# Patient Record
Sex: FEMALE | Race: WHITE | Employment: OTHER | URBAN - METROPOLITAN AREA
[De-identification: names, ages, dates, MRNs, and addresses within clinical notes are randomized per-mention and may not be internally consistent; named-entity substitution may affect disease eponyms.]

---

## 2018-07-11 ENCOUNTER — TELEPHONE (OUTPATIENT)
Dept: PAIN MANAGEMENT | Age: 63
End: 2018-07-11

## 2018-07-11 NOTE — TELEPHONE ENCOUNTER
A referral was received for this patient, however, we are not in network with their insurance unless being referred by a Collect or [address] [place] [address]. We do not accept any Urgent Care or Emergency Room referrals. A letter was faxed to the referring provider to notify them of this decision so they can provide appropriate instructions for the patient on how to continue their care.

## 2018-07-26 ENCOUNTER — HOSPITAL ENCOUNTER (OUTPATIENT)
Dept: CT IMAGING | Age: 63
Discharge: HOME | End: 2018-07-26
Attending: UROLOGY | Admitting: UROLOGY

## 2018-07-26 ENCOUNTER — HOSPITAL ENCOUNTER (OUTPATIENT)
Dept: PREADMISSION TESTING | Age: 63
Discharge: HOME | End: 2018-07-26
Attending: UROLOGY | Admitting: UROLOGY

## 2018-07-26 DIAGNOSIS — C67.5 MALIGNANT NEOPLASM OF BLADDER NECK (HCC): ICD-10-CM

## 2018-07-26 DIAGNOSIS — C80.1 MALIGNANT NEOPLASM (HCC): ICD-10-CM

## 2018-07-26 LAB
ANION GAP SERPL CALCULATED.3IONS-SCNC: 8 MMOL/L (ref 3–16)
APTT: 29.7 SEC (ref 26–36)
BUN BLDV-MCNC: 9 MG/DL (ref 7–20)
CALCIUM SERPL-MCNC: 9 MG/DL (ref 8.3–10.6)
CHLORIDE BLD-SCNC: 102 MMOL/L (ref 99–110)
CO2: 30 MMOL/L (ref 21–32)
CREAT SERPL-MCNC: <0.5 MG/DL (ref 0.6–1.2)
GFR AFRICAN AMERICAN: >60
GFR NON-AFRICAN AMERICAN: >60
GLUCOSE BLD-MCNC: 76 MG/DL (ref 70–99)
INR BLD: 1.03 (ref 0.86–1.14)
POTASSIUM SERPL-SCNC: 3 MMOL/L (ref 3.5–5.1)
PROTHROMBIN TIME: 11.7 SEC (ref 9.8–13)
SODIUM BLD-SCNC: 140 MMOL/L (ref 136–145)

## 2018-07-26 RX ORDER — CEFAZOLIN SODIUM 2 G/100ML
2 INJECTION, SOLUTION INTRAVENOUS
Status: CANCELLED | OUTPATIENT
Start: 2018-07-26 | End: 2018-07-26

## 2018-07-26 RX ORDER — DULOXETIN HYDROCHLORIDE 60 MG/1
60 CAPSULE, DELAYED RELEASE ORAL DAILY
COMMUNITY

## 2018-07-26 RX ORDER — LIDOCAINE HYDROCHLORIDE 10 MG/ML
0.5 INJECTION, SOLUTION EPIDURAL; INFILTRATION; INTRACAUDAL; PERINEURAL ONCE
Status: CANCELLED | OUTPATIENT
Start: 2018-07-26 | End: 2018-07-26

## 2018-07-26 RX ORDER — OMEPRAZOLE 20 MG/1
40 CAPSULE, DELAYED RELEASE ORAL DAILY
COMMUNITY
End: 2018-11-16 | Stop reason: ALTCHOICE

## 2018-07-26 RX ORDER — GABAPENTIN 800 MG/1
600 TABLET ORAL 3 TIMES DAILY
Status: ON HOLD | COMMUNITY
End: 2018-08-01 | Stop reason: CLARIF

## 2018-07-26 RX ORDER — METHOCARBAMOL 500 MG/1
500 TABLET, FILM COATED ORAL 2 TIMES DAILY
COMMUNITY
End: 2018-11-16 | Stop reason: ALTCHOICE

## 2018-07-26 RX ORDER — ALPRAZOLAM 0.5 MG/1
0.5 TABLET ORAL NIGHTLY PRN
Status: ON HOLD | COMMUNITY
End: 2018-08-01 | Stop reason: CLARIF

## 2018-07-26 RX ORDER — MORPHINE SULFATE 10 MG/1
10 CAPSULE, EXTENDED RELEASE ORAL DAILY
Status: ON HOLD | COMMUNITY
End: 2018-08-01 | Stop reason: CLARIF

## 2018-07-26 RX ORDER — FERROUS SULFATE 325(65) MG
325 TABLET ORAL
COMMUNITY
End: 2018-09-02

## 2018-07-26 RX ORDER — SODIUM CHLORIDE, SODIUM LACTATE, POTASSIUM CHLORIDE, CALCIUM CHLORIDE 600; 310; 30; 20 MG/100ML; MG/100ML; MG/100ML; MG/100ML
INJECTION, SOLUTION INTRAVENOUS CONTINUOUS
Status: CANCELLED | OUTPATIENT
Start: 2018-07-26

## 2018-07-26 RX ORDER — CLOPIDOGREL BISULFATE 75 MG/1
75 TABLET ORAL DAILY
COMMUNITY

## 2018-07-26 ASSESSMENT — LIFESTYLE VARIABLES: SMOKING_STATUS: 1

## 2018-07-26 NOTE — PRE-PROCEDURE INSTRUCTIONS
wear ___contact lenses or ___glasses, they will be removed; please bring a case for them. 12. Please see your family doctor/pediatrician for a history & physical and/or concerning medications. Bring any test results/reports from your physician's office. PCP__________________Phone___________H&P Appt. Date________             13 If you  have a Living Will and Durable Power of  for Healthcare, please bring in a copy. 15. Notify your Surgeon if you develop any illness between now and surgery  time, cough, cold, fever, sore throat, nausea, vomiting, etc.  Please notify your surgeon if you experience dizziness, shortness of breath or blurred vision between now & the time of your surgery             15. DO NOT shave your operative site 96 hours prior to surgery. For face & neck surgery, men may use an electric razor 48 hours prior to surgery. 16. Shower the night before surgery with ___Antibacterial soap ___Hibiclens             17. To provide excellent care visitors will be limited to one in the room at any given time. 18.  Please bring picture ID and insurance card. 19.  Visit our web site for additional information:  [email or web address]              20.During flu season no children under the age of 15 are permitted in the hospital for the safety of all patients. 21. If you take a long acting insulin in the evening only  take half of your usual  dose the night  before your procedure              22. If you use a c-pap please bring DOS if staying overnight,             23.For your convenience [facility] has a pharmacy on site to fill your prescriptions. 24. If you use oxygen and have a portable tank please bring it  with you the DOS             25. Bring a complete list of all your medications with name and dose include any supplements.              26. Other__________________________________________   *Please

## 2018-07-31 PROBLEM — R11.2 NAUSEA & VOMITING: Status: ACTIVE | Noted: 2018-07-31

## 2018-08-02 ENCOUNTER — HOSPITAL ENCOUNTER (OUTPATIENT)
Dept: SURGERY | Age: 63
Discharge: HOME | End: 2018-07-26
Attending: UROLOGY | Admitting: UROLOGY

## 2018-08-21 ENCOUNTER — OFFICE VISIT (OUTPATIENT)
Dept: SURGERY | Age: 63
End: 2018-08-21

## 2018-08-21 VITALS — SYSTOLIC BLOOD PRESSURE: 106 MMHG | WEIGHT: 143 LBS | DIASTOLIC BLOOD PRESSURE: 56 MMHG | BODY MASS INDEX: 24.55 KG/M2

## 2018-08-21 DIAGNOSIS — K44.9 HIATAL HERNIA: ICD-10-CM

## 2018-08-21 DIAGNOSIS — K31.89 ACUTE GASTRIC VOLVULUS: Primary | ICD-10-CM

## 2018-08-21 PROCEDURE — 99024 POSTOP FOLLOW-UP VISIT: CPT | Performed by: SURGERY

## 2018-08-25 PROBLEM — R10.9 ABDOMINAL PAIN: Status: ACTIVE | Noted: 2018-08-25

## 2018-09-02 PROBLEM — R11.10 INTRACTABLE VOMITING: Status: ACTIVE | Noted: 2018-09-02

## 2018-09-21 ENCOUNTER — HOSPITAL ENCOUNTER (INPATIENT)
Dept: EMERGENCY DEPT | Age: 63
LOS: 4 days | Discharge: HOME HEALTH | DRG: 422 | End: 2018-09-25
Attending: EMERGENCY MEDICINE | Admitting: INTERNAL MEDICINE
Payer: MEDICARE

## 2018-09-21 DIAGNOSIS — E87.6 HYPOKALEMIA: ICD-10-CM

## 2018-09-21 DIAGNOSIS — E86.0 DEHYDRATION: ICD-10-CM

## 2018-09-21 DIAGNOSIS — R19.7 DIARRHEA, UNSPECIFIED TYPE: Primary | ICD-10-CM

## 2018-09-21 DIAGNOSIS — N99.528 COMPLICATION OF UROSTOMY (HCC): ICD-10-CM

## 2018-09-21 DIAGNOSIS — C67.9 ADENOCARCINOMA OF BLADDER (HCC): ICD-10-CM

## 2018-09-21 PROBLEM — K21.9 GERD (GASTROESOPHAGEAL REFLUX DISEASE): Status: ACTIVE | Noted: 2018-09-21

## 2018-09-21 PROBLEM — Z93.2 HIGH OUTPUT ILEOSTOMY (HCC): Status: ACTIVE | Noted: 2018-09-21

## 2018-09-21 PROBLEM — R19.8 HIGH OUTPUT ILEOSTOMY (HCC): Status: ACTIVE | Noted: 2018-09-21

## 2018-09-21 PROBLEM — F41.9 ANXIETY: Status: ACTIVE | Noted: 2018-09-21

## 2018-09-21 PROBLEM — E78.00 HIGH CHOLESTEROL: Status: ACTIVE | Noted: 2018-09-21

## 2018-09-21 LAB
A/G RATIO: 1.1 (ref 1.1–2.2)
ALBUMIN SERPL-MCNC: 3.7 G/DL (ref 3.4–5)
ALP BLD-CCNC: 164 U/L (ref 40–129)
ALT SERPL-CCNC: 14 U/L (ref 10–40)
ANION GAP SERPL CALCULATED.3IONS-SCNC: 17 MMOL/L (ref 3–16)
AST SERPL-CCNC: 14 U/L (ref 15–37)
BACTERIA: ABNORMAL /HPF
BASOPHILS ABSOLUTE: 0.1 K/UL (ref 0–0.2)
BASOPHILS RELATIVE PERCENT: 0.7 %
BILIRUB SERPL-MCNC: 0.4 MG/DL (ref 0–1)
BILIRUBIN URINE: NEGATIVE
BLOOD, URINE: ABNORMAL
BUN BLDV-MCNC: 10 MG/DL (ref 7–20)
CALCIUM SERPL-MCNC: 9.6 MG/DL (ref 8.3–10.6)
CHLORIDE BLD-SCNC: 102 MMOL/L (ref 99–110)
CLARITY: ABNORMAL
CO2: 21 MMOL/L (ref 21–32)
COLOR: YELLOW
CREAT SERPL-MCNC: <0.5 MG/DL (ref 0.6–1.2)
EOSINOPHILS ABSOLUTE: 0.1 K/UL (ref 0–0.6)
EOSINOPHILS RELATIVE PERCENT: 0.6 %
EPITHELIAL CELLS, UA: 0 /HPF (ref 0–5)
GFR AFRICAN AMERICAN: >60
GFR NON-AFRICAN AMERICAN: >60
GLOBULIN: 3.3 G/DL
GLUCOSE BLD-MCNC: 108 MG/DL (ref 70–99)
GLUCOSE URINE: NEGATIVE MG/DL
HCT VFR BLD CALC: 47 % (ref 36–48)
HEMOGLOBIN: 15.3 G/DL (ref 12–16)
HYALINE CASTS: 8 /LPF (ref 0–8)
KETONES, URINE: NEGATIVE MG/DL
LACTIC ACID: 1.2 MMOL/L (ref 0.4–2)
LEUKOCYTE ESTERASE, URINE: ABNORMAL
LIPASE: 21 U/L (ref 13–60)
LYMPHOCYTES ABSOLUTE: 1.5 K/UL (ref 1–5.1)
LYMPHOCYTES RELATIVE PERCENT: 14.7 %
MCH RBC QN AUTO: 33 PG (ref 26–34)
MCHC RBC AUTO-ENTMCNC: 32.6 G/DL (ref 31–36)
MCV RBC AUTO: 101 FL (ref 80–100)
MICROSCOPIC EXAMINATION: YES
MONOCYTES ABSOLUTE: 0.3 K/UL (ref 0–1.3)
MONOCYTES RELATIVE PERCENT: 3 %
NEUTROPHILS ABSOLUTE: 8.4 K/UL (ref 1.7–7.7)
NEUTROPHILS RELATIVE PERCENT: 81 %
NITRITE, URINE: NEGATIVE
PDW BLD-RTO: 18.9 % (ref 12.4–15.4)
PH UA: 7.5
PLATELET # BLD: 347 K/UL (ref 135–450)
PMV BLD AUTO: 7.6 FL (ref 5–10.5)
POTASSIUM SERPL-SCNC: 2.8 MMOL/L (ref 3.5–5.1)
PROTEIN UA: ABNORMAL MG/DL
RBC # BLD: 4.65 M/UL (ref 4–5.2)
RBC UA: 2 /HPF (ref 0–4)
SODIUM BLD-SCNC: 140 MMOL/L (ref 136–145)
SPECIFIC GRAVITY UA: >1.03
TOTAL PROTEIN: 7 G/DL (ref 6.4–8.2)
URINE REFLEX TO CULTURE: YES
URINE TYPE: ABNORMAL
UROBILINOGEN, URINE: 0.2 E.U./DL
WBC # BLD: 10.4 K/UL (ref 4–11)
WBC UA: 33 /HPF (ref 0–5)

## 2018-09-21 PROCEDURE — 81001 URINALYSIS AUTO W/SCOPE: CPT

## 2018-09-21 PROCEDURE — 9990 CHARGE CONVERSION

## 2018-09-21 PROCEDURE — 85025 COMPLETE CBC W/AUTO DIFF WBC: CPT

## 2018-09-21 PROCEDURE — 74177 CT ABD & PELVIS W/CONTRAST: CPT

## 2018-09-21 PROCEDURE — 87186 SC STD MICRODIL/AGAR DIL: CPT

## 2018-09-21 PROCEDURE — 36415 COLL VENOUS BLD VENIPUNCTURE: CPT

## 2018-09-21 PROCEDURE — 87040 BLOOD CULTURE FOR BACTERIA: CPT

## 2018-09-21 PROCEDURE — 87077 CULTURE AEROBIC IDENTIFY: CPT

## 2018-09-21 PROCEDURE — 83690 ASSAY OF LIPASE: CPT

## 2018-09-21 PROCEDURE — 99285 EMERGENCY DEPT VISIT HI MDM: CPT

## 2018-09-21 PROCEDURE — 83605 ASSAY OF LACTIC ACID: CPT

## 2018-09-21 PROCEDURE — 87086 URINE CULTURE/COLONY COUNT: CPT

## 2018-09-21 PROCEDURE — 80053 COMPREHEN METABOLIC PANEL: CPT

## 2018-09-21 RX ORDER — 0.9 % SODIUM CHLORIDE 0.9 %
1000 INTRAVENOUS SOLUTION INTRAVENOUS ONCE
Status: COMPLETED | OUTPATIENT
Start: 2018-09-21 | End: 2018-09-21

## 2018-09-21 RX ORDER — MORPHINE SULFATE 4 MG/ML
4 INJECTION, SOLUTION INTRAMUSCULAR; INTRAVENOUS ONCE
Status: COMPLETED | OUTPATIENT
Start: 2018-09-21 | End: 2018-09-21

## 2018-09-21 RX ORDER — 0.9 % SODIUM CHLORIDE 0.9 %
1000 INTRAVENOUS SOLUTION INTRAVENOUS ONCE
Status: DISCONTINUED | OUTPATIENT
Start: 2018-09-21 | End: 2018-09-21

## 2018-09-21 RX ORDER — POTASSIUM CHLORIDE AND SODIUM CHLORIDE 900; 300 MG/100ML; MG/100ML
100 INJECTION, SOLUTION INTRAVENOUS CONTINUOUS
Status: DISCONTINUED | OUTPATIENT
Start: 2018-09-21 | End: 2018-09-24

## 2018-09-21 RX ORDER — ONDANSETRON 2 MG/ML
4 INJECTION INTRAMUSCULAR; INTRAVENOUS ONCE
Status: COMPLETED | OUTPATIENT
Start: 2018-09-21 | End: 2018-09-21

## 2018-09-21 RX ADMIN — POTASSIUM CHLORIDE AND SODIUM CHLORIDE 100 ML/HR: 900; 300 INJECTION, SOLUTION INTRAVENOUS at 20:54

## 2018-09-21 RX ADMIN — ONDANSETRON 4 MG: 2 INJECTION INTRAMUSCULAR; INTRAVENOUS at 20:55

## 2018-09-21 RX ADMIN — MORPHINE SULFATE 4 MG: 4 INJECTION, SOLUTION INTRAMUSCULAR; INTRAVENOUS at 20:58

## 2018-09-21 RX ADMIN — Medication 1000 ML: at 22:45

## 2018-09-21 ASSESSMENT — ENCOUNTER SYMPTOMS
CONSTIPATION: 0
ABDOMINAL DISTENTION: 0
COUGH: 0
ABDOMINAL PAIN: 1
RECTAL PAIN: 0
RESPIRATORY NEGATIVE: 1
PHOTOPHOBIA: 0
SHORTNESS OF BREATH: 0
COLOR CHANGE: 0
DIARRHEA: 1
ANAL BLEEDING: 0
BLOOD IN STOOL: 0
NAUSEA: 1
VOMITING: 1

## 2018-09-21 ASSESSMENT — PAIN SCALES - GENERAL
PAINLEVEL_OUTOF10: 7

## 2018-09-21 ASSESSMENT — PAIN DESCRIPTION - PAIN TYPE
TYPE: ACUTE PAIN
TYPE: ACUTE PAIN

## 2018-09-21 ASSESSMENT — PAIN DESCRIPTION - PROGRESSION: CLINICAL_PROGRESSION: NOT CHANGED

## 2018-09-21 ASSESSMENT — PAIN DESCRIPTION - LOCATION
LOCATION: ABDOMEN
LOCATION: ABDOMEN

## 2018-09-22 LAB
ANION GAP SERPL CALCULATED.3IONS-SCNC: 10 MMOL/L (ref 3–16)
BASOPHILS ABSOLUTE: 0 K/UL (ref 0–0.2)
BASOPHILS RELATIVE PERCENT: 0.4 %
BUN BLDV-MCNC: 7 MG/DL (ref 7–20)
CALCIUM SERPL-MCNC: 8.7 MG/DL (ref 8.3–10.6)
CHLORIDE BLD-SCNC: 110 MMOL/L (ref 99–110)
CO2: 21 MMOL/L (ref 21–32)
CREAT SERPL-MCNC: 0.5 MG/DL (ref 0.6–1.2)
EOSINOPHILS ABSOLUTE: 0.2 K/UL (ref 0–0.6)
EOSINOPHILS RELATIVE PERCENT: 2.7 %
GFR AFRICAN AMERICAN: >60
GFR NON-AFRICAN AMERICAN: >60
GLUCOSE BLD-MCNC: 73 MG/DL (ref 70–99)
HCT VFR BLD CALC: 39.3 % (ref 36–48)
HEMOGLOBIN: 13 G/DL (ref 12–16)
LYMPHOCYTES ABSOLUTE: 1.5 K/UL (ref 1–5.1)
LYMPHOCYTES RELATIVE PERCENT: 20.9 %
MCH RBC QN AUTO: 34 PG (ref 26–34)
MCHC RBC AUTO-ENTMCNC: 33 G/DL (ref 31–36)
MCV RBC AUTO: 103.2 FL (ref 80–100)
MONOCYTES ABSOLUTE: 0.3 K/UL (ref 0–1.3)
MONOCYTES RELATIVE PERCENT: 3.6 %
NEUTROPHILS ABSOLUTE: 5.2 K/UL (ref 1.7–7.7)
NEUTROPHILS RELATIVE PERCENT: 72.4 %
PDW BLD-RTO: 18.3 % (ref 12.4–15.4)
PLATELET # BLD: 253 K/UL (ref 135–450)
PMV BLD AUTO: 7.7 FL (ref 5–10.5)
POTASSIUM SERPL-SCNC: 3.4 MMOL/L (ref 3.5–5.1)
RBC # BLD: 3.81 M/UL (ref 4–5.2)
SODIUM BLD-SCNC: 141 MMOL/L (ref 136–145)
WBC # BLD: 7.2 K/UL (ref 4–11)

## 2018-09-22 PROCEDURE — 6370000000 HC RX 637 (ALT 250 FOR IP): Performed by: INTERNAL MEDICINE

## 2018-09-22 PROCEDURE — 93005 ELECTROCARDIOGRAM TRACING: CPT | Performed by: INTERNAL MEDICINE

## 2018-09-22 PROCEDURE — 93010 ELECTROCARDIOGRAM REPORT: CPT | Performed by: INTERNAL MEDICINE

## 2018-09-22 PROCEDURE — 80048 BASIC METABOLIC PNL TOTAL CA: CPT

## 2018-09-22 PROCEDURE — 36415 COLL VENOUS BLD VENIPUNCTURE: CPT

## 2018-09-22 PROCEDURE — 6360000002 HC RX W HCPCS: Performed by: INTERNAL MEDICINE

## 2018-09-22 PROCEDURE — 87449 NOS EACH ORGANISM AG IA: CPT

## 2018-09-22 PROCEDURE — APPNB30 APP NON BILLABLE TIME 0-30 MINS: Performed by: NURSE PRACTITIONER

## 2018-09-22 PROCEDURE — 1200000000 HC SEMI PRIVATE

## 2018-09-22 PROCEDURE — 85025 COMPLETE CBC W/AUTO DIFF WBC: CPT

## 2018-09-22 PROCEDURE — 6360000002 HC RX W HCPCS: Performed by: EMERGENCY MEDICINE

## 2018-09-22 PROCEDURE — 87324 CLOSTRIDIUM AG IA: CPT

## 2018-09-22 PROCEDURE — 99024 POSTOP FOLLOW-UP VISIT: CPT | Performed by: SURGERY

## 2018-09-22 RX ORDER — DULOXETIN HYDROCHLORIDE 30 MG/1
60 CAPSULE, DELAYED RELEASE ORAL DAILY
Status: DISCONTINUED | OUTPATIENT
Start: 2018-09-22 | End: 2018-09-25 | Stop reason: HOSPADM

## 2018-09-22 RX ORDER — POTASSIUM CHLORIDE 7.45 MG/ML
10 INJECTION INTRAVENOUS PRN
Status: DISCONTINUED | OUTPATIENT
Start: 2018-09-22 | End: 2018-09-25 | Stop reason: HOSPADM

## 2018-09-22 RX ORDER — SODIUM CHLORIDE 9 MG/ML
INJECTION, SOLUTION INTRAVENOUS CONTINUOUS
Status: DISCONTINUED | OUTPATIENT
Start: 2018-09-22 | End: 2018-09-23

## 2018-09-22 RX ORDER — POTASSIUM CHLORIDE 20 MEQ/1
40 TABLET, EXTENDED RELEASE ORAL PRN
Status: DISCONTINUED | OUTPATIENT
Start: 2018-09-22 | End: 2018-09-25 | Stop reason: HOSPADM

## 2018-09-22 RX ORDER — HYDROXYUREA 500 MG/1
500 CAPSULE ORAL
Status: DISCONTINUED | OUTPATIENT
Start: 2018-09-22 | End: 2018-09-22

## 2018-09-22 RX ORDER — ATORVASTATIN CALCIUM 40 MG/1
40 TABLET, FILM COATED ORAL EVERY EVENING
Status: DISCONTINUED | OUTPATIENT
Start: 2018-09-22 | End: 2018-09-25 | Stop reason: HOSPADM

## 2018-09-22 RX ORDER — SODIUM CHLORIDE 0.9 % (FLUSH) 0.9 %
10 SYRINGE (ML) INJECTION EVERY 12 HOURS SCHEDULED
Status: DISCONTINUED | OUTPATIENT
Start: 2018-09-22 | End: 2018-09-25 | Stop reason: HOSPADM

## 2018-09-22 RX ORDER — 0.9 % SODIUM CHLORIDE 0.9 %
500 INTRAVENOUS SOLUTION INTRAVENOUS PRN
Status: DISCONTINUED | OUTPATIENT
Start: 2018-09-22 | End: 2018-09-25 | Stop reason: HOSPADM

## 2018-09-22 RX ORDER — MORPHINE SULFATE 15 MG/1
15 TABLET, FILM COATED, EXTENDED RELEASE ORAL 2 TIMES DAILY
Status: DISCONTINUED | OUTPATIENT
Start: 2018-09-22 | End: 2018-09-25 | Stop reason: HOSPADM

## 2018-09-22 RX ORDER — ONDANSETRON 2 MG/ML
INJECTION INTRAMUSCULAR; INTRAVENOUS
Status: DISPENSED
Start: 2018-09-22 | End: 2018-09-22

## 2018-09-22 RX ORDER — CLOPIDOGREL BISULFATE 75 MG/1
75 TABLET ORAL DAILY
Status: DISCONTINUED | OUTPATIENT
Start: 2018-09-22 | End: 2018-09-22

## 2018-09-22 RX ORDER — GABAPENTIN 400 MG/1
800 CAPSULE ORAL 3 TIMES DAILY
Status: DISCONTINUED | OUTPATIENT
Start: 2018-09-22 | End: 2018-09-25 | Stop reason: HOSPADM

## 2018-09-22 RX ORDER — SODIUM CHLORIDE 0.9 % (FLUSH) 0.9 %
10 SYRINGE (ML) INJECTION PRN
Status: DISCONTINUED | OUTPATIENT
Start: 2018-09-22 | End: 2018-09-25 | Stop reason: HOSPADM

## 2018-09-22 RX ORDER — DOCUSATE SODIUM 100 MG/1
100 CAPSULE, LIQUID FILLED ORAL 2 TIMES DAILY
Status: DISCONTINUED | OUTPATIENT
Start: 2018-09-22 | End: 2018-09-25 | Stop reason: HOSPADM

## 2018-09-22 RX ORDER — METHOCARBAMOL 500 MG/1
500 TABLET, FILM COATED ORAL 2 TIMES DAILY
Status: DISCONTINUED | OUTPATIENT
Start: 2018-09-22 | End: 2018-09-25 | Stop reason: HOSPADM

## 2018-09-22 RX ORDER — HEPARIN SODIUM 5000 [USP'U]/ML
5000 INJECTION, SOLUTION INTRAVENOUS; SUBCUTANEOUS EVERY 8 HOURS SCHEDULED
Status: DISCONTINUED | OUTPATIENT
Start: 2018-09-22 | End: 2018-09-25 | Stop reason: HOSPADM

## 2018-09-22 RX ORDER — PANTOPRAZOLE SODIUM 40 MG/1
40 TABLET, DELAYED RELEASE ORAL
Status: DISCONTINUED | OUTPATIENT
Start: 2018-09-22 | End: 2018-09-25 | Stop reason: HOSPADM

## 2018-09-22 RX ORDER — HYDROXYUREA 500 MG/1
500 CAPSULE ORAL
Status: DISCONTINUED | OUTPATIENT
Start: 2018-09-24 | End: 2018-09-25 | Stop reason: HOSPADM

## 2018-09-22 RX ORDER — OMEPRAZOLE 20 MG/1
40 CAPSULE, DELAYED RELEASE ORAL DAILY
Status: DISCONTINUED | OUTPATIENT
Start: 2018-09-22 | End: 2018-09-22 | Stop reason: CLARIF

## 2018-09-22 RX ORDER — ALPRAZOLAM 0.5 MG/1
0.5 TABLET ORAL 2 TIMES DAILY
Status: DISCONTINUED | OUTPATIENT
Start: 2018-09-22 | End: 2018-09-25 | Stop reason: HOSPADM

## 2018-09-22 RX ORDER — OXYCODONE AND ACETAMINOPHEN 7.5; 325 MG/1; MG/1
1 TABLET ORAL EVERY 8 HOURS PRN
Status: DISCONTINUED | OUTPATIENT
Start: 2018-09-22 | End: 2018-09-24

## 2018-09-22 RX ORDER — ONDANSETRON 2 MG/ML
4 INJECTION INTRAMUSCULAR; INTRAVENOUS EVERY 6 HOURS PRN
Status: DISCONTINUED | OUTPATIENT
Start: 2018-09-22 | End: 2018-09-25 | Stop reason: HOSPADM

## 2018-09-22 RX ADMIN — OXYCODONE HYDROCHLORIDE AND ACETAMINOPHEN 1 TABLET: 7.5; 325 TABLET ORAL at 23:41

## 2018-09-22 RX ADMIN — MORPHINE SULFATE 15 MG: 15 TABLET, FILM COATED, EXTENDED RELEASE ORAL at 01:30

## 2018-09-22 RX ADMIN — MORPHINE SULFATE 15 MG: 15 TABLET, FILM COATED, EXTENDED RELEASE ORAL at 21:08

## 2018-09-22 RX ADMIN — DESMOPRESSIN ACETATE 40 MG: 0.2 TABLET ORAL at 18:55

## 2018-09-22 RX ADMIN — HEPARIN SODIUM 5000 UNITS: 5000 INJECTION INTRAVENOUS; SUBCUTANEOUS at 21:08

## 2018-09-22 RX ADMIN — DULOXETINE HYDROCHLORIDE 60 MG: 30 CAPSULE, DELAYED RELEASE ORAL at 08:11

## 2018-09-22 RX ADMIN — ONDANSETRON 4 MG: 2 INJECTION INTRAMUSCULAR; INTRAVENOUS at 05:30

## 2018-09-22 RX ADMIN — HEPARIN SODIUM 5000 UNITS: 5000 INJECTION INTRAVENOUS; SUBCUTANEOUS at 08:30

## 2018-09-22 RX ADMIN — OXYCODONE HYDROCHLORIDE AND ACETAMINOPHEN 1 TABLET: 7.5; 325 TABLET ORAL at 12:14

## 2018-09-22 RX ADMIN — ALPRAZOLAM 0.5 MG: 0.5 TABLET ORAL at 08:11

## 2018-09-22 RX ADMIN — PANTOPRAZOLE SODIUM 40 MG: 40 TABLET, DELAYED RELEASE ORAL at 08:10

## 2018-09-22 RX ADMIN — POTASSIUM BICARBONATE 40 MEQ: 782 TABLET, EFFERVESCENT ORAL at 15:50

## 2018-09-22 RX ADMIN — METHOCARBAMOL 500 MG: 500 TABLET ORAL at 01:30

## 2018-09-22 RX ADMIN — ALPRAZOLAM 0.5 MG: 0.5 TABLET ORAL at 21:08

## 2018-09-22 RX ADMIN — POTASSIUM CHLORIDE AND SODIUM CHLORIDE 100 ML/HR: 900; 300 INJECTION, SOLUTION INTRAVENOUS at 08:01

## 2018-09-22 RX ADMIN — ALPRAZOLAM 0.5 MG: 0.5 TABLET ORAL at 01:30

## 2018-09-22 RX ADMIN — HEPARIN SODIUM 5000 UNITS: 5000 INJECTION INTRAVENOUS; SUBCUTANEOUS at 01:30

## 2018-09-22 RX ADMIN — HEPARIN SODIUM 5000 UNITS: 5000 INJECTION INTRAVENOUS; SUBCUTANEOUS at 15:43

## 2018-09-22 RX ADMIN — POTASSIUM CHLORIDE AND SODIUM CHLORIDE 100 ML/HR: 900; 300 INJECTION, SOLUTION INTRAVENOUS at 18:54

## 2018-09-22 RX ADMIN — METHOCARBAMOL 500 MG: 500 TABLET ORAL at 08:11

## 2018-09-22 RX ADMIN — GABAPENTIN 800 MG: 400 CAPSULE ORAL at 21:08

## 2018-09-22 RX ADMIN — GABAPENTIN 800 MG: 400 CAPSULE ORAL at 01:30

## 2018-09-22 RX ADMIN — GABAPENTIN 800 MG: 400 CAPSULE ORAL at 15:43

## 2018-09-22 RX ADMIN — CLOPIDOGREL 75 MG: 75 TABLET, FILM COATED ORAL at 08:10

## 2018-09-22 RX ADMIN — MORPHINE SULFATE 15 MG: 15 TABLET, FILM COATED, EXTENDED RELEASE ORAL at 08:11

## 2018-09-22 RX ADMIN — GABAPENTIN 800 MG: 400 CAPSULE ORAL at 08:11

## 2018-09-22 RX ADMIN — METHOCARBAMOL 500 MG: 500 TABLET ORAL at 15:43

## 2018-09-22 RX ADMIN — ONDANSETRON 4 MG: 2 INJECTION INTRAMUSCULAR; INTRAVENOUS at 15:48

## 2018-09-22 ASSESSMENT — PAIN DESCRIPTION - LOCATION
LOCATION: ABDOMEN

## 2018-09-22 ASSESSMENT — PAIN SCALES - GENERAL
PAINLEVEL_OUTOF10: 5
PAINLEVEL_OUTOF10: 4
PAINLEVEL_OUTOF10: 7
PAINLEVEL_OUTOF10: 6
PAINLEVEL_OUTOF10: 7
PAINLEVEL_OUTOF10: 7
PAINLEVEL_OUTOF10: 8
PAINLEVEL_OUTOF10: 7

## 2018-09-22 ASSESSMENT — PAIN DESCRIPTION - PAIN TYPE
TYPE: CHRONIC PAIN

## 2018-09-22 ASSESSMENT — PAIN DESCRIPTION - PROGRESSION: CLINICAL_PROGRESSION: GRADUALLY IMPROVING

## 2018-09-22 NOTE — ED NOTES
Pt cleaned and linen changed. Urostomy changed and taped around site. #2 saline lock inserted with #2 blood culture drawn and NS infusing IV w/o with NS with KCL at 100 ml/hr IV. Pt to be admitted. Resting at present.      Og Islas RN  09/21/18 5130

## 2018-09-22 NOTE — PROGRESS NOTES
Progress Note - Dr. Cuca Stearns - Internal Medicine  PCP: Christi Carlos MD [address] / Jerardo Cough [id number]    Hospital Day: 1  Code Status: Full Code  Current Diet: DIET GENERAL;        CC: follow up on medical issues    Subjective:   Anu Vela is a 58 y.o. female. She admits to problems    Still with leakage to ostomy  Complains of dysphagia and pain to epigastric area    She denies chest pain, denies shortness of breath, denies nausea,  denies emesis. 10 system Review of Systems is reviewed with patient, and pertinent positives are listed here: None . Otherwise, Review of systems is negative. I have reviewed the patient's medical and social history in detail and updated the computerized patient record. To recap: She  has a past medical history of Bladder cancer ([facility] Blood clot in vein; Depression; Hx of blood clots; and Polycythemia. . She  has a past surgical history that includes Carpal tunnel release; Pilonidal cyst drainage; Foot surgery; Hysterectomy; Cholecystectomy; above knee amputation (Right); hiatal hernia repair (08/03/2018); and Bladder removal (08/27/2018). . She  reports that she quit smoking about 2 months ago. Her smoking use included Cigarettes. She has a 60.00 pack-year smoking history. She has never used smokeless tobacco. She reports that she does not drink alcohol or use drugs. .        Active Hospital Problems    Diagnosis Date Noted    Hypokalemia [E87.6] 09/21/2018    High output ileostomy ([email or web address] [R19.8, Z93.2] 09/21/2018    GERD (gastroesophageal reflux disease) [K21.9] 09/21/2018    Anxiety [F41.9] 09/21/2018    High cholesterol [E78.00] 09/21/2018    Dehydration [E86.0] 09/21/2018       Current Facility-Administered Medications: nicotine (NICODERM CQ) 7 MG/24HR 1 patch, 1 patch, Transdermal, Daily  atorvastatin (LIPITOR) tablet 40 mg, 40 mg, Oral, QPM  clopidogrel (PLAVIX) tablet 75 mg, 75 mg, Oral, Daily  DULoxetine (CYMBALTA) extended release capsule 60 mg, 60 mg, Oral, Daily  methocarbamol (ROBAXIN) tablet 500 mg, 500 mg, Oral, BID  morphine (MS CONTIN) extended release tablet 15 mg, 15 mg, Oral, BID  ALPRAZolam (XANAX) tablet 0.5 mg, 0.5 mg, Oral, BID  gabapentin (NEURONTIN) capsule 800 mg, 800 mg, Oral, TID  0.9 % sodium chloride infusion, , Intravenous, Continuous  sodium chloride flush 0.9 % injection 10 mL, 10 mL, Intravenous, 2 times per day  sodium chloride flush 0.9 % injection 10 mL, 10 mL, Intravenous, PRN  docusate sodium (COLACE) capsule 100 mg, 100 mg, Oral, BID  ondansetron (ZOFRAN) injection 4 mg, 4 mg, Intravenous, Q6H PRN  heparin (porcine) injection 5,000 Units, 5,000 Units, Subcutaneous, 3 times per day  potassium chloride (KLOR-CON M) extended release tablet 40 mEq, 40 mEq, Oral, PRN **OR** potassium bicarb-citric acid (EFFER-K) effervescent tablet 40 mEq, 40 mEq, Oral, PRN **OR** potassium chloride 10 mEq/100 mL IVPB (Peripheral Line), 10 mEq, Intravenous, PRN  0.9 % sodium chloride bolus, 500 mL, Intravenous, PRN  ondansetron (ZOFRAN) 4 MG/2ML injection, , ,   pantoprazole (PROTONIX) tablet 40 mg, 40 mg, Oral, QAM AC  oxyCODONE-acetaminophen (PERCOCET) 7.5-325 MG per tablet 1 tablet, 1 tablet, Oral, Q8H PRN  hydroxyurea (HYDREA) chemo capsule 500 mg, 500 mg, Oral, As Directed RT PRN  0.9% NaCl with KCl 40 mEq infusion, 100 mL/hr, Intravenous, Continuous         Objective:  /76   Pulse 70   Temp 98 °F (36.7 °C) (Oral)   Resp 20   Wt 130 lb (59 kg)   SpO2 95%   BMI 22.31 kg/m²      Patient Vitals for the past 24 hrs:   BP Temp Temp src Pulse Resp SpO2 Weight   09/22/18 0800 123/76 98 °F (36.7 °C) Oral 70 20 95 % -   09/21/18 2300 131/77 - - 75 16 99 % -   09/21/18 2200 112/82 - - 77 15 98 % -   09/21/18 2145 114/78 - - 78 24 96 % -   09/21/18 2130 112/74 - - 78 21 98 % -   09/21/18 2115 107/71 - - 78 21 96 % -   09/21/18 2045 - - - 84 17 - -   09/21/18 2030 - - - 101 25 - -   09/21/18 2015 - - - 86 21 - - history is now available. The patient underwent hiatal hernia repair with Nissen fundoplication on 03/82/2705. The changes at the GE junction are are consistent with previous Nissen repair. If there is continued symptomatology, consider GI study. Xr Chest Portable    Result Date: 8/25/2018  EXAMINATION: SINGLE XRAY VIEW OF THE CHEST 8/25/2018 5:19 pm COMPARISON: 07/06/2009 HISTORY: ORDERING PHYSICIAN PROVIDED HISTORY: vomiting TECHNOLOGIST PROVIDED HISTORY: Technologist Provided Reason for Exam: NAUSEA AND VOMITING. FORMER SMOKER. HISTORY OF BLADDER CANCER, DVT AND POLYCYTHEMIA Acuity: Acute Type of Encounter: Initial History of bladder carcinoma, blood clots, Nissen fundoplication 08/91/9911 FINDINGS: No acute bony abnormality. The heart size and mediastinal contours are stable. The lungs are clear. Negative portable study. Assessment and Plan:  Patient Active Hospital Problem List:   High output ileostomy ([facility] (9/21/2018)    Assessment: Established problem. Stable. surg asked to see. Plan: cont local care. Monitor I/o. Trend serial labs. Given epigastric pain and fullness, will also ask GI to see   Hypokalemia (9/21/2018)    Assessment: Established problem. imprved 3.4    Plan: cont fluids, cont kcl replacement   GERD (gastroesophageal reflux disease) (9/21/2018)    Assessment: Established problem. Stable. Plan: Continue present orders/plan. Anxiety (9/21/2018)    Assessment: Established problem. Stable. Plan: Continue present orders/plan. High cholesterol (9/21/2018)    Assessment: Established problem. Stable. .    Plan: Continue present orders/plan. Dehydration (9/21/2018)    Assessment: due to high output    Plan: ivf ordered    Pt states that PCP is Dr Layla Lozoya - he admitted her a few weeks ago. She would very much like to be seen by her PCP. I have placed consult to him.  Also have left msg via perfect amy Sullivan  9/22/2018

## 2018-09-22 NOTE — PROGRESS NOTES
Was able to contact dr Quentin Huber  He will assume care and start rounding on pt tomorrow    Attending MD changed in Bradley Kauffman after his acceptance

## 2018-09-22 NOTE — PROGRESS NOTES
Spoke with Dr. Chang Ortiz with urology, order obtained to consult wound/ostomy nurse. Urostomy leaking, changed, skin care provided, will monitor.  Ginny Watts

## 2018-09-22 NOTE — ED NOTES
Report called to [facility] RN. Pt to be transferred to room 470.      Na Metzger RN  09/21/18 5936

## 2018-09-22 NOTE — H&P
History and Physical  Dr. Shirin Diaz  9/21/2018    PCP: Mercedes Hernandez MD    Cc:   Chief Complaint   Patient presents with    Emesis     vomiting x5 days. patient reports hasn't been able to eat or drink anything. patient reports leaking stoma. Johnney Yaya abdominal pain. dry heaves in triage. +diarrhea       HPI:  Aleja Sutherland is a 58 y.o.  female who  has a past medical history of Bladder cancer ([facility] Blood clot in vein; Depression; Hx of blood clots; and Polycythemia. Patient presents with High output ileostomy ([facility] HPI of presenting complaint in block format: (1-3 for expanded problem focused, ?4 for detailed/comprehensive)   Location: abd pain. Leaking from ostomy  Duration: for 2 days now  Timing:    Context: 58 y.o. female with past medical history of bladder cancer, previous blood clot, depression, polycythemia who presents ED with complaint of nausea, vomiting, diarrhea and generalized abdominal pain. States aching pain rated 7/10 diffusely throughout her abdomen. States had watery output from ostomy site. Severity: pain rated 7/10  Quality: crampy achy pain  Modifying Factors: worse after eating  Associated Signs or Symptoms: Denies chest pain or shortness of breath. Associated fatigue and weakness. Denies urinary symptoms. Problem list of hospitalization thus far: Active Hospital Problems    Diagnosis    Hypokalemia [E87.6]    High output ileostomy (HCC) [R19.8, Z93.2]    GERD (gastroesophageal reflux disease) [K21.9]    Anxiety [F41.9]    High cholesterol [E78.00]         Review of Systems: (1 system for EPF, 2-9 for detailed, 10+ for comprehensive)  Review of Systems   Constitutional: Positive for malaise/fatigue. Negative for chills and fever. HENT: Negative for congestion and nosebleeds. Eyes: Negative for double vision and photophobia. Respiratory: Positive for shortness of breath. Negative for cough. Cardiovascular: Negative for chest pain and palpitations. Gastrointestinal: Positive for abdominal pain, diarrhea, nausea and vomiting. Genitourinary: Negative for dysuria and frequency. Musculoskeletal: Positive for myalgias. Negative for back pain. Skin: Negative for itching and rash. Neurological: Positive for weakness. Negative for dizziness, sensory change and seizures. Endo/Heme/Allergies: Negative for environmental allergies. Psychiatric/Behavioral: Positive for depression. The patient is not nervous/anxious. Past Medical History:   Past Medical History:   Diagnosis Date    Bladder cancer ([facility]     Blood clot in vein     right leg    Depression     Hx of blood clots     Polycythemia        Past Surgical History:   Past Surgical History:   Procedure Laterality Date    ABOVE KNEE AMPUTATION Right     BLADDER REMOVAL  08/27/2018    Robotic assisted laparoscopic radical cystectomy with ileal conduit, bilateral pelvic lymphadenectomy, umbilectomy, and layered closure of the abdominal wall    CARPAL TUNNEL RELEASE      BILAT    CHOLECYSTECTOMY      FOOT SURGERY      BILAT FOR RECONSTRUCTION    HIATAL HERNIA REPAIR  08/03/2018    LAPAROSCOPIC HERNIA HIATAL WITH NISSEN FUNDOPLICATION    HYSTERECTOMY      PARTIAL    PILONIDAL CYST DRAINAGE         Social History:   Social History     Social History    Marital status: Single     Spouse name: N/A    Number of children: N/A    Years of education: N/A     Occupational History    Not on file.      Social History Main Topics    Smoking status: Former Smoker     Packs/day: 1.50     Years: 40.00     Types: Cigarettes     Quit date: 7/19/2018    Smokeless tobacco: Never Used    Alcohol use No    Drug use: No    Sexual activity: Not on file     Other Topics Concern    Not on file     Social History Narrative    No narrative on file       Fam History:   Family History   Problem Relation Age of Onset    Cancer Mother     Heart Disease Father     Diabetes Brother        PFSH: The above Assessment: Established problem. Stable. Plan: Continue present orders/plan. Diagnoses as listed above, designated as new or established and plan outlined for each. Data Reviewed:   (1) Lab tests were reviewed or ordered. (1) Radiology tests were reviewed or ordered. (1) Medical test (Echo, EKG, PFT/stanley) were ordered. (1) History was not obtained from someone other than patient  (1) Old records  were reviewed - see HPI/MDM for pertinent details if review done. (2) Case was discussed with another health care provider: Dr Ev Carl  (2) Imaging was viewed by myself. Ct abd/pelvis  (2) EKG  was not viewed by myself. The patient is being placed in inpatient status with the expectation of requiring a hospital stay spanning at least two midnights for care and treatment of the problems noted in the problem list.  This determination is also based on the patients comorbidities and past medical history, the severity and timing of the signs and symptoms upon presentation.         Electronically signed by: Kishor Tena 9/21/2018

## 2018-09-22 NOTE — CONSULTS
y.o. female. I saw the patient today for leakage from ostomy, and associated symptoms of dehydration, that have been present for 1 week. Symptoms relieved by nothing and aggravated by nothing. She has tried the following treatments: going to the ER. Past Medical History:   She has a past medical history of Bladder cancer ([address] Blood clot in vein; Depression; blood clots; and Polycythemia. Past Surgical History:  She has a past surgical history that includes Carpal tunnel release; Pilonidal cyst drainage; Foot surgery; Hysterectomy; Cholecystectomy; above knee amputation (Right); hiatal hernia repair (08/03/2018); and Bladder removal (08/27/2018). Allergies:   No Known Allergies    Social History:  She reports that she quit smoking about 2 months ago. Her smoking use included Cigarettes. She has a 60.00 pack-year smoking history. She has never used smokeless tobacco. She reports that she does not drink alcohol or use drugs. Family History:  family history includes Cancer in her mother; Diabetes in her brother; Heart Disease in her father.     Medications:   Scheduled Meds:   nicotine  1 patch Transdermal Daily    atorvastatin  40 mg Oral QPM    clopidogrel  75 mg Oral Daily    DULoxetine  60 mg Oral Daily    methocarbamol  500 mg Oral BID    morphine  15 mg Oral BID    ALPRAZolam  0.5 mg Oral BID    gabapentin  800 mg Oral TID    sodium chloride flush  10 mL Intravenous 2 times per day    docusate sodium  100 mg Oral BID    heparin (porcine)  5,000 Units Subcutaneous 3 times per day    ondansetron        pantoprazole  40 mg Oral QAM AC     Continuous Infusions:   sodium chloride Stopped (09/22/18 0812)    0.9% NaCl with KCl 40 mEq 100 mL/hr (09/22/18 0801)     PRN Meds:sodium chloride flush, ondansetron, potassium chloride **OR** potassium bicarb-citric acid **OR** potassium chloride, sodium chloride    Review of Systems:  Constitutional: Negative for fever    Genitourinary: see

## 2018-09-22 NOTE — ED PROVIDER NOTES
REFLEX TO CULTURE   LACTIC ACID, PLASMA     Radiology Studies:   CT ABDOMEN PELVIS W IV CONTRAST Additional Contrast? None    (Results Pending)       Labs, urine, abd CT, stool studies, meds and fluids. For all further history/physical please see subsequent provider note for further details and disposition. Comment: Please note this report has been produced using speech recognition software and may contain errors related to that system including errors in grammar, punctuation, and spelling, as well as words and phrases that may be inappropriate. If there are any questions or concerns please feel free to contact the dictating provider for clarification.      Electronically signed, Susan De Souza PA-C,          Ariella Mooney, [place]  09/21/18 3020
daily. Chao Mann NICOTINE (NICODERM CQ) 7 MG/24HR    Place 1 patch onto the skin    OMEPRAZOLE (PRILOSEC) 20 MG DELAYED RELEASE CAPSULE    Take 40 mg by mouth daily         ALLERGIES     Patient has no known allergies. FAMILY HISTORY       Family History   Problem Relation Age of Onset    Cancer Mother     Heart Disease Father     Diabetes Brother           SOCIAL HISTORY       Social History     Social History    Marital status: Single     Spouse name: N/A    Number of children: N/A    Years of education: N/A     Social History Main Topics    Smoking status: Former Smoker     Packs/day: 1.50     Years: 40.00     Types: Cigarettes     Quit date: 7/19/2018    Smokeless tobacco: Never Used    Alcohol use No    Drug use: No    Sexual activity: Not Asked     Other Topics Concern    None     Social History Narrative    None       SCREENINGS             PHYSICAL EXAM    (up to 7 for level 4, 8 or more for level 5)     ED Triage Vitals   BP Temp Temp src Pulse Resp SpO2 Height Weight   09/21/18 1624 09/21/18 1623 -- 09/21/18 1623 09/21/18 1623 09/21/18 1623 -- 09/21/18 1623   97/62 99.1 °F (37.3 °C)  120 18 95 %  130 lb (59 kg)       Physical Exam   Constitutional: She is oriented to person, place, and time. She appears well-developed and well-nourished. No distress. HENT:   Head: Normocephalic and atraumatic. Right Ear: External ear normal.   Left Ear: External ear normal.   Eyes: Conjunctivae are normal. Right eye exhibits no discharge. Left eye exhibits no discharge. Neck: Normal range of motion. Neck supple. Cardiovascular: Normal rate, regular rhythm, normal heart sounds and intact distal pulses. Exam reveals no gallop and no friction rub. No murmur heard. Pulmonary/Chest: Effort normal and breath sounds normal. No stridor. No respiratory distress. She has no wheezes. She has no rales. She exhibits no tenderness. Abdominal: Soft. Bowel sounds are normal. She exhibits no distension.  There is

## 2018-09-22 NOTE — PROGRESS NOTES
Urostomy dressing leaking, changed per orders. Pt skin care provided, urostomy dressing CDI at this time, will monitor.  Lou Blum RN

## 2018-09-22 NOTE — PROGRESS NOTES
Chavez Bocanegra is a 58 y.o. female patient. 1. Diarrhea, unspecified type    2. Hypokalemia    3. Dehydration    4. Complication of urostomy [facility])      Past Medical History:   Diagnosis Date    Bladder cancer ([address]     Blood clot in vein     right leg    Depression     Hx of blood clots     Polycythemia      No past surgical history pertinent negatives on file. Scheduled Meds:   nicotine  1 patch Transdermal Daily    atorvastatin  40 mg Oral QPM    clopidogrel  75 mg Oral Daily    DULoxetine  60 mg Oral Daily    methocarbamol  500 mg Oral BID    morphine  15 mg Oral BID    ALPRAZolam  0.5 mg Oral BID    gabapentin  800 mg Oral TID    sodium chloride flush  10 mL Intravenous 2 times per day    docusate sodium  100 mg Oral BID    heparin (porcine)  5,000 Units Subcutaneous 3 times per day    ondansetron        pantoprazole  40 mg Oral QAM AC     Continuous Infusions:   sodium chloride Stopped (09/22/18 0812)    0.9% NaCl with KCl 40 mEq 100 mL/hr (09/22/18 0801)     PRN Meds:sodium chloride flush, ondansetron, potassium chloride **OR** potassium bicarb-citric acid **OR** potassium chloride, sodium chloride    No Known Allergies  Principal Problem:    High output ileostomy (HCC)  Active Problems:    Hypokalemia    GERD (gastroesophageal reflux disease)    Anxiety    High cholesterol    Dehydration  Resolved Problems:    * No resolved hospital problems. *    Blood pressure 123/76, pulse 70, temperature 98 °F (36.7 °C), temperature source Oral, resp. rate 20, weight 130 lb (59 kg), SpO2 95 %, not currently breastfeeding. Subjective:   Diet: NPO. Activity level: Normal.    Pain control: Well controlled. Objective:  Vital signs (most recent): Blood pressure 123/76, pulse 70, temperature 98 °F (36.7 °C), temperature source Oral, resp. rate 20, weight 130 lb (59 kg), SpO2 95 %, not currently breastfeeding. General appearance: Comfortable. Lungs:  Normal respiratory rate and normal effort.

## 2018-09-23 LAB
ANION GAP SERPL CALCULATED.3IONS-SCNC: 8 MMOL/L (ref 3–16)
BASOPHILS ABSOLUTE: 0 K/UL (ref 0–0.2)
BASOPHILS RELATIVE PERCENT: 0.6 %
BUN BLDV-MCNC: 8 MG/DL (ref 7–20)
C DIFFICILE TOXIN, EIA: NORMAL
CALCIUM SERPL-MCNC: 8.6 MG/DL (ref 8.3–10.6)
CHLORIDE BLD-SCNC: 111 MMOL/L (ref 99–110)
CO2: 22 MMOL/L (ref 21–32)
CREAT SERPL-MCNC: <0.5 MG/DL (ref 0.6–1.2)
EOSINOPHILS ABSOLUTE: 0.4 K/UL (ref 0–0.6)
EOSINOPHILS RELATIVE PERCENT: 5.2 %
GFR AFRICAN AMERICAN: >60
GFR NON-AFRICAN AMERICAN: >60
GLUCOSE BLD-MCNC: 97 MG/DL (ref 70–99)
HCT VFR BLD CALC: 38.9 % (ref 36–48)
HEMOGLOBIN: 12.5 G/DL (ref 12–16)
LYMPHOCYTES ABSOLUTE: 1.1 K/UL (ref 1–5.1)
LYMPHOCYTES RELATIVE PERCENT: 16 %
MCH RBC QN AUTO: 33.5 PG (ref 26–34)
MCHC RBC AUTO-ENTMCNC: 32.2 G/DL (ref 31–36)
MCV RBC AUTO: 104 FL (ref 80–100)
MONOCYTES ABSOLUTE: 0.2 K/UL (ref 0–1.3)
MONOCYTES RELATIVE PERCENT: 3.1 %
NEUTROPHILS ABSOLUTE: 5.2 K/UL (ref 1.7–7.7)
NEUTROPHILS RELATIVE PERCENT: 75.1 %
PDW BLD-RTO: 18.3 % (ref 12.4–15.4)
PLATELET # BLD: 217 K/UL (ref 135–450)
PMV BLD AUTO: 8.3 FL (ref 5–10.5)
POTASSIUM SERPL-SCNC: 4.6 MMOL/L (ref 3.5–5.1)
RBC # BLD: 3.74 M/UL (ref 4–5.2)
SODIUM BLD-SCNC: 141 MMOL/L (ref 136–145)
WBC # BLD: 7 K/UL (ref 4–11)

## 2018-09-23 PROCEDURE — 99024 POSTOP FOLLOW-UP VISIT: CPT | Performed by: SURGERY

## 2018-09-23 PROCEDURE — APPNB30 APP NON BILLABLE TIME 0-30 MINS: Performed by: NURSE PRACTITIONER

## 2018-09-23 PROCEDURE — 6370000000 HC RX 637 (ALT 250 FOR IP): Performed by: INTERNAL MEDICINE

## 2018-09-23 PROCEDURE — 92526 ORAL FUNCTION THERAPY: CPT

## 2018-09-23 PROCEDURE — 36415 COLL VENOUS BLD VENIPUNCTURE: CPT

## 2018-09-23 PROCEDURE — 6360000002 HC RX W HCPCS: Performed by: EMERGENCY MEDICINE

## 2018-09-23 PROCEDURE — G8996 SWALLOW CURRENT STATUS: HCPCS

## 2018-09-23 PROCEDURE — 6360000002 HC RX W HCPCS: Performed by: INTERNAL MEDICINE

## 2018-09-23 PROCEDURE — 92610 EVALUATE SWALLOWING FUNCTION: CPT

## 2018-09-23 PROCEDURE — 2580000003 HC RX 258: Performed by: INTERNAL MEDICINE

## 2018-09-23 PROCEDURE — G8997 SWALLOW GOAL STATUS: HCPCS

## 2018-09-23 PROCEDURE — 1200000000 HC SEMI PRIVATE

## 2018-09-23 PROCEDURE — 85025 COMPLETE CBC W/AUTO DIFF WBC: CPT

## 2018-09-23 PROCEDURE — 80048 BASIC METABOLIC PNL TOTAL CA: CPT

## 2018-09-23 RX ADMIN — METHOCARBAMOL 500 MG: 500 TABLET ORAL at 07:56

## 2018-09-23 RX ADMIN — GABAPENTIN 800 MG: 400 CAPSULE ORAL at 13:07

## 2018-09-23 RX ADMIN — POTASSIUM CHLORIDE AND SODIUM CHLORIDE 100 ML/HR: 900; 300 INJECTION, SOLUTION INTRAVENOUS at 05:24

## 2018-09-23 RX ADMIN — PANTOPRAZOLE SODIUM 40 MG: 40 TABLET, DELAYED RELEASE ORAL at 05:25

## 2018-09-23 RX ADMIN — DULOXETINE HYDROCHLORIDE 60 MG: 30 CAPSULE, DELAYED RELEASE ORAL at 07:56

## 2018-09-23 RX ADMIN — DESMOPRESSIN ACETATE 40 MG: 0.2 TABLET ORAL at 17:52

## 2018-09-23 RX ADMIN — MORPHINE SULFATE 15 MG: 15 TABLET, FILM COATED, EXTENDED RELEASE ORAL at 20:41

## 2018-09-23 RX ADMIN — OXYCODONE HYDROCHLORIDE AND ACETAMINOPHEN 1 TABLET: 7.5; 325 TABLET ORAL at 13:07

## 2018-09-23 RX ADMIN — MORPHINE SULFATE 15 MG: 15 TABLET, FILM COATED, EXTENDED RELEASE ORAL at 07:56

## 2018-09-23 RX ADMIN — HEPARIN SODIUM 5000 UNITS: 5000 INJECTION INTRAVENOUS; SUBCUTANEOUS at 13:07

## 2018-09-23 RX ADMIN — DOCUSATE SODIUM 100 MG: 100 CAPSULE, LIQUID FILLED ORAL at 20:41

## 2018-09-23 RX ADMIN — HEPARIN SODIUM 5000 UNITS: 5000 INJECTION INTRAVENOUS; SUBCUTANEOUS at 05:25

## 2018-09-23 RX ADMIN — ALPRAZOLAM 0.5 MG: 0.5 TABLET ORAL at 07:56

## 2018-09-23 RX ADMIN — OXYCODONE HYDROCHLORIDE AND ACETAMINOPHEN 1 TABLET: 7.5; 325 TABLET ORAL at 07:56

## 2018-09-23 RX ADMIN — GABAPENTIN 800 MG: 400 CAPSULE ORAL at 20:41

## 2018-09-23 RX ADMIN — POTASSIUM CHLORIDE AND SODIUM CHLORIDE 100 ML/HR: 900; 300 INJECTION, SOLUTION INTRAVENOUS at 20:06

## 2018-09-23 RX ADMIN — ALPRAZOLAM 0.5 MG: 0.5 TABLET ORAL at 20:40

## 2018-09-23 RX ADMIN — OXYCODONE HYDROCHLORIDE AND ACETAMINOPHEN 1 TABLET: 7.5; 325 TABLET ORAL at 17:52

## 2018-09-23 RX ADMIN — METHOCARBAMOL 500 MG: 500 TABLET ORAL at 17:52

## 2018-09-23 RX ADMIN — HEPARIN SODIUM 5000 UNITS: 5000 INJECTION INTRAVENOUS; SUBCUTANEOUS at 20:41

## 2018-09-23 RX ADMIN — GABAPENTIN 800 MG: 400 CAPSULE ORAL at 07:56

## 2018-09-23 RX ADMIN — Medication 10 ML: at 21:00

## 2018-09-23 ASSESSMENT — PAIN SCALES - GENERAL
PAINLEVEL_OUTOF10: 6
PAINLEVEL_OUTOF10: 8
PAINLEVEL_OUTOF10: 9
PAINLEVEL_OUTOF10: 6

## 2018-09-23 ASSESSMENT — PAIN DESCRIPTION - PAIN TYPE: TYPE: CHRONIC PAIN

## 2018-09-23 ASSESSMENT — PAIN DESCRIPTION - LOCATION: LOCATION: ABDOMEN

## 2018-09-23 NOTE — PROGRESS NOTES
Venus Hudson is a 58 y.o. female patient. 1. Diarrhea, unspecified type    2. Hypokalemia    3. Dehydration    4. Complication of urostomy [facility])      Past Medical History:   Diagnosis Date    Bladder cancer ([address]     Blood clot in vein     right leg    Depression     Hx of blood clots     Polycythemia      No past surgical history pertinent negatives on file. Scheduled Meds:   nicotine  1 patch Transdermal Daily    atorvastatin  40 mg Oral QPM    DULoxetine  60 mg Oral Daily    methocarbamol  500 mg Oral BID    morphine  15 mg Oral BID    ALPRAZolam  0.5 mg Oral BID    gabapentin  800 mg Oral TID    sodium chloride flush  10 mL Intravenous 2 times per day    docusate sodium  100 mg Oral BID    heparin (porcine)  5,000 Units Subcutaneous 3 times per day    pantoprazole  40 mg Oral QAM AC    [START ON 9/24/2018] hydroxyurea  500 mg Oral Once per day on Mon Wed Fri     Continuous Infusions:   sodium chloride Stopped (09/22/18 0812)    0.9% NaCl with KCl 40 mEq 100 mL/hr (09/23/18 0524)     PRN Meds:sodium chloride flush, ondansetron, potassium chloride **OR** potassium bicarb-citric acid **OR** potassium chloride, sodium chloride, oxyCODONE-acetaminophen    No Known Allergies  Principal Problem:    High output ileostomy (HCC)  Active Problems:    Hypokalemia    GERD (gastroesophageal reflux disease)    Anxiety    High cholesterol    Dehydration  Resolved Problems:    * No resolved hospital problems. *    Blood pressure 104/71, pulse 80, temperature 97.7 °F (36.5 °C), temperature source Oral, resp. rate 16, height 5' 4\" (1.626 m), weight 130 lb (59 kg), SpO2 96 %, not currently breastfeeding. Subjective:   Diet: Adequate intake. Activity level: Normal.    Pain control: Well controlled. Objective:  Vital signs (most recent): Blood pressure 104/71, pulse 80, temperature 97.7 °F (36.5 °C), temperature source Oral, resp.  rate 16, height 5' 4\" (1.626 m), weight 130 lb (59 kg), SpO2 96 %, not

## 2018-09-23 NOTE — PROGRESS NOTES
PRN Percocet given for abdominal pain, see MAR. Pt has had no loose stool so far this shift, will continue to monitor.

## 2018-09-23 NOTE — PROGRESS NOTES
consult. Seen by radiation oncology at [facility] but did not get a call back. · Polycythemia vera. 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 The goal is to lower her Hct to 42%. On Hydrea 500 mg three times a week. Counts under good control. · Right leg arterial ischemia and thrombosis, status post AKA. · CVA x 2.   · Chronic tobacco use. · Chronic pain syndrome. · Depression. On antidepressant. Reviewed labs. She has improved. Has ileal conduit leakage. Seen by urology. EGD tomorrow. Hopefully discharge soon. 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 Explained to the patient. Leslie Moreno M.D.; M.S. Medical Oncology/Hematology  Phone: [phone]  Fax: [phone]    [id number], [address]   [facility]

## 2018-09-24 PROBLEM — E44.1 MILD MALNUTRITION (HCC): Chronic | Status: ACTIVE | Noted: 2018-09-24

## 2018-09-24 LAB
CLOSTRIDIUM DIFFICILE DNA AMPLIFICATION: NORMAL
EKG ATRIAL RATE: 118 BPM
EKG DIAGNOSIS: NORMAL
EKG P AXIS: 73 DEGREES
EKG P-R INTERVAL: 132 MS
EKG Q-T INTERVAL: 344 MS
EKG QRS DURATION: 76 MS
EKG QTC CALCULATION (BAZETT): 482 MS
EKG R AXIS: -14 DEGREES
EKG T AXIS: 44 DEGREES
EKG VENTRICULAR RATE: 118 BPM
ORGANISM: ABNORMAL
URINE CULTURE, ROUTINE: ABNORMAL

## 2018-09-24 PROCEDURE — 6370000000 HC RX 637 (ALT 250 FOR IP): Performed by: INTERNAL MEDICINE

## 2018-09-24 PROCEDURE — APPNB30 APP NON BILLABLE TIME 0-30 MINS: Performed by: NURSE PRACTITIONER

## 2018-09-24 PROCEDURE — 1200000000 HC SEMI PRIVATE

## 2018-09-24 PROCEDURE — 2580000003 HC RX 258: Performed by: INTERNAL MEDICINE

## 2018-09-24 PROCEDURE — APPSS15 APP SPLIT SHARED TIME 0-15 MINUTES: Performed by: NURSE PRACTITIONER

## 2018-09-24 PROCEDURE — 6360000002 HC RX W HCPCS: Performed by: EMERGENCY MEDICINE

## 2018-09-24 PROCEDURE — 99231 SBSQ HOSP IP/OBS SF/LOW 25: CPT | Performed by: SURGERY

## 2018-09-24 PROCEDURE — 6360000002 HC RX W HCPCS: Performed by: INTERNAL MEDICINE

## 2018-09-24 RX ORDER — OXYCODONE AND ACETAMINOPHEN 7.5; 325 MG/1; MG/1
1 TABLET ORAL EVERY 4 HOURS PRN
Status: DISCONTINUED | OUTPATIENT
Start: 2018-09-24 | End: 2018-09-25 | Stop reason: HOSPADM

## 2018-09-24 RX ADMIN — Medication 10 ML: at 21:00

## 2018-09-24 RX ADMIN — Medication 10 ML: at 08:49

## 2018-09-24 RX ADMIN — GABAPENTIN 800 MG: 400 CAPSULE ORAL at 21:39

## 2018-09-24 RX ADMIN — DESMOPRESSIN ACETATE 40 MG: 0.2 TABLET ORAL at 17:57

## 2018-09-24 RX ADMIN — MORPHINE SULFATE 15 MG: 15 TABLET, FILM COATED, EXTENDED RELEASE ORAL at 08:49

## 2018-09-24 RX ADMIN — POTASSIUM CHLORIDE AND SODIUM CHLORIDE 100 ML/HR: 900; 300 INJECTION, SOLUTION INTRAVENOUS at 05:41

## 2018-09-24 RX ADMIN — DULOXETINE HYDROCHLORIDE 60 MG: 30 CAPSULE, DELAYED RELEASE ORAL at 08:49

## 2018-09-24 RX ADMIN — GABAPENTIN 800 MG: 400 CAPSULE ORAL at 08:49

## 2018-09-24 RX ADMIN — MORPHINE SULFATE 15 MG: 15 TABLET, FILM COATED, EXTENDED RELEASE ORAL at 21:39

## 2018-09-24 RX ADMIN — ALPRAZOLAM 0.5 MG: 0.5 TABLET ORAL at 08:50

## 2018-09-24 RX ADMIN — OXYCODONE HYDROCHLORIDE AND ACETAMINOPHEN 1 TABLET: 7.5; 325 TABLET ORAL at 17:57

## 2018-09-24 RX ADMIN — HEPARIN SODIUM 5000 UNITS: 5000 INJECTION INTRAVENOUS; SUBCUTANEOUS at 15:50

## 2018-09-24 RX ADMIN — GABAPENTIN 800 MG: 400 CAPSULE ORAL at 15:50

## 2018-09-24 RX ADMIN — ALPRAZOLAM 0.5 MG: 0.5 TABLET ORAL at 21:39

## 2018-09-24 RX ADMIN — HEPARIN SODIUM 5000 UNITS: 5000 INJECTION INTRAVENOUS; SUBCUTANEOUS at 21:39

## 2018-09-24 RX ADMIN — POTASSIUM CHLORIDE AND SODIUM CHLORIDE 100 ML/HR: 900; 300 INJECTION, SOLUTION INTRAVENOUS at 16:40

## 2018-09-24 RX ADMIN — OXYCODONE HYDROCHLORIDE AND ACETAMINOPHEN 1 TABLET: 7.5; 325 TABLET ORAL at 12:05

## 2018-09-24 RX ADMIN — METHOCARBAMOL 500 MG: 500 TABLET ORAL at 08:49

## 2018-09-24 RX ADMIN — HYDROXYUREA 500 MG: 500 CAPSULE ORAL at 08:50

## 2018-09-24 RX ADMIN — OXYCODONE HYDROCHLORIDE AND ACETAMINOPHEN 1 TABLET: 7.5; 325 TABLET ORAL at 22:49

## 2018-09-24 RX ADMIN — METHOCARBAMOL 500 MG: 500 TABLET ORAL at 15:51

## 2018-09-24 RX ADMIN — OXYCODONE HYDROCHLORIDE AND ACETAMINOPHEN 1 TABLET: 7.5; 325 TABLET ORAL at 03:49

## 2018-09-24 ASSESSMENT — PAIN SCALES - GENERAL
PAINLEVEL_OUTOF10: 3
PAINLEVEL_OUTOF10: 5
PAINLEVEL_OUTOF10: 3
PAINLEVEL_OUTOF10: 5
PAINLEVEL_OUTOF10: 5
PAINLEVEL_OUTOF10: 6
PAINLEVEL_OUTOF10: 6
PAINLEVEL_OUTOF10: 3
PAINLEVEL_OUTOF10: 6

## 2018-09-24 ASSESSMENT — ENCOUNTER SYMPTOMS
ABDOMINAL PAIN: 1
BACK PAIN: 0
NAUSEA: 1
VOMITING: 1
DOUBLE VISION: 0
DIARRHEA: 1
COUGH: 0
SHORTNESS OF BREATH: 1
PHOTOPHOBIA: 0

## 2018-09-24 ASSESSMENT — PAIN DESCRIPTION - PAIN TYPE
TYPE: CHRONIC PAIN
TYPE: CHRONIC PAIN

## 2018-09-24 ASSESSMENT — PAIN DESCRIPTION - PROGRESSION: CLINICAL_PROGRESSION: NOT CHANGED

## 2018-09-24 ASSESSMENT — PAIN DESCRIPTION - FREQUENCY: FREQUENCY: CONTINUOUS

## 2018-09-24 ASSESSMENT — PAIN DESCRIPTION - ONSET: ONSET: GRADUAL

## 2018-09-24 ASSESSMENT — PAIN DESCRIPTION - LOCATION
LOCATION: ABDOMEN

## 2018-09-24 ASSESSMENT — PAIN DESCRIPTION - ORIENTATION: ORIENTATION: LEFT;UPPER

## 2018-09-24 ASSESSMENT — PAIN DESCRIPTION - DESCRIPTORS: DESCRIPTORS: SORE

## 2018-09-24 NOTE — PROGRESS NOTES
Progress Note    Admit Date:  9/21/2018    Subjective:  Ms. Kota Thompson is feeling somewhat better. Review of Systems:  No constipation. No chest pain or palpitations. No headache. No cough or wheezing. No urinary complaints. Objective:   /70   Pulse 77   Temp 97.9 °F (36.6 °C) (Oral)   Resp 16   Ht 5' 4\" (1.626 m)   Wt 130 lb (59 kg)   SpO2 94%   BMI 22.31 kg/m²       Intake/Output Summary (Last 24 hours) at 09/24/18 1724  Last data filed at 09/24/18 1716   Gross per 24 hour   Intake             2055 ml   Output             2850 ml   Net             -795 ml       Physical Exam:  GENERAL:  Patient is awake, alert and oriented to time, place and person. Pleasant and cooperative. HEENT: Mild pallor, no icterus. Hydration is fair  NECK:  No JVP,no carotid bruit. No cervical lymphadenopathy. CHEST and LUNGS:  B/L air entry is present; clear to auscultation, respiration easy  HEART:  Regular Rate and Rhythm. S1S2 present. No murmur. ABDOMEN:  Soft, non-tender. No hepatomegaly or mass. Bowel sounds are present. Urostomy present+  EXTREMITIES:  No pedal edema. No clubbing or cyanosis.  S/p Right AKA    Medications:    nicotine  1 patch Transdermal Daily    atorvastatin  40 mg Oral QPM    DULoxetine  60 mg Oral Daily    methocarbamol  500 mg Oral BID    morphine  15 mg Oral BID    ALPRAZolam  0.5 mg Oral BID    gabapentin  800 mg Oral TID    sodium chloride flush  10 mL Intravenous 2 times per day    docusate sodium  100 mg Oral BID    heparin (porcine)  5,000 Units Subcutaneous 3 times per day    pantoprazole  40 mg Oral QAM AC    hydroxyurea  500 mg Oral Once per day on Mon Wed Fri       Data:  CBC:   Recent Labs      09/22/18   1104  09/23/18   1109   WBC  7.2  7.0   HGB  13.0  12.5   HCT  39.3  38.9   MCV  103.2*  104.0*   PLT  253  217     BMP:   Recent Labs      09/22/18   1104  09/23/18   1109   NA  141  141   K  3.4*  4.6   CL  110  111*   CO2  21  22   BUN  7  8   CREATININE  0.5*  <0.5*

## 2018-09-24 NOTE — PROGRESS NOTES
Speech Language Pathology  Therapy Attempt Note    ST attempted this afternoon. Pt working with respiratory therapy at time of attempt. Will re-attempt as schedule permits.      Jose Angel Bullock 41  Speech Language Pathologist

## 2018-09-24 NOTE — PLAN OF CARE
Problem: Falls - Risk of:  Goal: Will remain free from falls  Will remain free from falls   Outcome: Ongoing  Free of falls. Call light in reach. Bed in lowest position. Bed alarm on. Personal items in reach. Problem: Fluid Volume - Imbalance:  Goal: Absence of imbalanced fluid volume signs and symptoms  Absence of imbalanced fluid volume signs and symptoms   Outcome: Ongoing  Showing no signs or symptoms of fluid imbalance. Taking fluids well. Problem: Health Maintenance - Impaired:  Goal: Able to sleep without medication for appropriate length of time  Able to sleep without medication for appropriate length of time   Outcome: Ongoing  Is able to sleep for appropriate lengths of time. No additional medication needed. Problem: Mood - Altered:  Goal: Mood stable  Mood stable   Outcome: Ongoing  Mood is stable this shift. Will continue to monitor. Problem: Pain:  Goal: Control of acute pain  Control of acute pain   Outcome: Ongoing  Pain controlled with medication. Patient satisfied with current treatment plan      Problem: Risk for Impaired Skin Integrity  Goal: Tissue integrity - skin and mucous membranes  Structural intactness and normal physiological function of skin and  mucous membranes. Outcome: Ongoing  No skin breakdown noted. Patient has Urostomy on right lower abdomen. Will continue to monitor.

## 2018-09-24 NOTE — PROGRESS NOTES
[facility] GI  Gastroenterology Progress Note  Nasreen Woo is a 58 y.o. female patient. 1. Diarrhea, unspecified type    2. Hypokalemia    3. Dehydration    4. Complication of urostomy ([address]        SUBJECTIVE:   Dysphagia for months and preceded the volvulus/fundoplication. Dysphagia is mild and tolerated a reg meal yesterday. No n/v. This has resolved. Physical    VITALS:  /68   Pulse 87   Temp 98.3 °F (36.8 °C) (Oral)   Resp 16   Ht 5' 4\" (1.626 m)   Wt 130 lb (59 kg)   SpO2 93%   BMI 22.31 kg/m²   TEMPERATURE:  Current - Temp: 98.3 °F (36.8 °C); Max - Temp  Av.9 °F (36.6 °C)  Min: 97.7 °F (36.5 °C)  Max: 98.3 °F (36.8 °C)    Abdomen soft, ND, minimal ttp. , no HSM, Bowel sounds normal   rrr   aaox 3   5/5  bue    Data      Recent Labs      18   1659  18   1104  18   1109   WBC  10.4  7.2  7.0   HGB  15.3  13.0  12.5   HCT  47.0  39.3  38.9   MCV  101.0*  103.2*  104.0*   PLT  347  253  217     Recent Labs      18   1659  18   1104  18   1109   NA  140  141  141   K  2.8*  3.4*  4.6   CL  102  110  111*   CO2  21  21  22   BUN  10  7  8   CREATININE  <0.5*  0.5*  <0.5*     Recent Labs      18   1659   AST  14*   ALT  14   BILITOT  0.4   ALKPHOS  164*     Recent Labs      18   1659   LIPASE  21.0             ASSESSMENT   1. Dysphagia- s/p fundoplication for gastric volvulus in Aug.    Dysphagia is chronic, mild at this point. Pt on Plavix and it has only been held 2 days now. PLAN    1. Plan EGD on Thursday when plavix will have been held for 5 days. I d/w the pt and she agrees with the plan.      Sue Handley MD  [address]

## 2018-09-24 NOTE — PROGRESS NOTES
Nutrition Assessment    Type and Reason for Visit: Initial, Positive Nutrition Screen    Nutrition Recommendations:   Encourage adequate PO intake    Malnutrition Assessment:  · Malnutrition Status: Mild Malnutrition  · Context: Chronic illness  · Findings of the 6 clinical characteristics of malnutrition (Minimum of 2 out of 6 clinical characteristics is required to make the diagnosis of moderate or severe Protein Calorie Malnutrition based on AND/ASPEN Guidelines):  1. Energy Intake-Less than or equal to 50%, greater than or equal to 5 days    2. Weight Loss-7.5% loss or greater,  (2 month)  3. Fat Loss-No significant subcutaneous fat loss,    4. Muscle Loss-No significant muscle mass loss,    5. Fluid Accumulation-No significant fluid accumulation,    6.  Strength-Not measured    Nutrition Diagnosis:   · Problem: Inadequate oral intake  · Etiology: related to Nausea, Vomiting     Signs and symptoms:  as evidenced by Patient report of, Diet history of poor intake    Nutrition Assessment:  · Subjective Assessment: Pt triggered nutrition evaluation for MST of 2, indicating wt loss and poor appetite, pt also with c/o n/v upon admission. Pt states UBW back in Feb was 178lb, noting that wt loss started after surgery in August.  Wt records show pt wt of 151lb in Sept.  CBW 130lb. Pt indicated limited PO intake over past 5 days d/t dry heaves. Pt is now eating fair and declines need for oral nutrition suppplement. Provided tips on how to decrease or prevent on-set of nausea. · Nutrition-Focused Physical Findings: active BS, no edema noted  · Current Nutrition Therapies:  · Oral Diet Orders: General   · Oral Diet intake: 51-75%  · Oral Nutrition Supplement (ONS) Orders: None  · Anthropometric Measures:  · Ht: 5' 4\" (162.6 cm)   · Current Body Wt: 130 lb (59 kg)  · % Weight Change: stated per pt: 48 lb / 27% loss in 7 months.   Per chart review, would appear as if pt has lost ~10 lb over past 2 months

## 2018-09-24 NOTE — PROGRESS NOTES
Contrast?->None Technologist Provided Reason for Exam: Emesis (Pt sent in by Dr Rogers Rinaldi for evaluation of emesis and weakness for the last 2 days, pt is scheduled to have her bladder removed on Monday here, pt has bladder cancer. Dr Tex Sol is her oncologist. ) Acuity: Acute Type of Encounter: Initial FINDINGS: Lower Chest: No acute infiltrate at the lung bases. Organs: The gallbladder is surgically absent. Stable mild intrahepatic and extrahepatic biliary dilatation, likely physiologic. The liver, pancreas and adrenal glands are unremarkable. Stable mild splenomegaly with the spleen measuring 13.8 cm in AP dimension. No renal mass or significant hydronephrosis. GI/Bowel: Interval postoperative changes at the GE junction with reduction in the hiatal hernia. The reduced stomach appears rotated about the long axis of the stomach. Edematous changes are present within the distal esophagus and proximal stomach. No significant distention is seen although only the distal esophagus is included on the exam.  Postoperative clips are present. The remainder of the stomach and duodenal C-loop are unremarkable. No pericolonic inflammatory changes. No small bowel distension. Pelvis: No pelvic mass or free pelvic fluid. The uterus is absent. Mild mucosal thickening in the base of the bladder is stable as noted previously. Peritoneum/Retroperitoneum: The abdominal aorta is normal in caliber with moderate calcified aortoiliac plaque. No retroperitoneal adenopathy or free fluid. Left-sided IVC is noted. Bones/Soft Tissues: No acute osseous or soft tissue abnormality. 1. Postoperative change consistent with repair of hiatal hernia. The stomach appears rotated along its long axis proximally concerning for volvulus pattern. Edematous changes are present in the distal esophagus and stomach. 2. No other acute findings within the abdomen or pelvis. 3. Previous cholecystectomy and hysterectomy.  4. Stable thickening along the

## 2018-09-24 NOTE — PROGRESS NOTES
[facility] FOLLOW-UP:     Primary Oncologist: Dr. Samina Kaminski  Date: 9/24/2018    PCP: Cletis Paget, MD  Referring Provider: No ref. provider found    PROBLEM LIST:     Patient Active Problem List   Diagnosis    Nausea & vomiting    Hiatal hernia    Acute gastric volvulus    Abdominal pain    Septicemia ([address]    Carcinoma of urachus (HCC)    Intractable vomiting    Hypokalemia    High output ileostomy (HCC)    GERD (gastroesophageal reflux disease)    Anxiety    High cholesterol    Dehydration       Specialty Problems     None          INTERVAL HISTORY     Denies nausea or vomiting. Denies abdominal pain, diarrhea or constipation. REVIEW OF SYSTEMS:     CONSTITUTIONAL: Denies fever, sweats, weight loss     EYES: No visual changes or diplopia. No scleral icterus. ENT: No Headaches, hearing loss or vertigo. No mouth sores or sore throat. CARDIOVASCULAR: No chest pain, dyspnea on exertion, palpitations or loss of consciousness. RESPIRATORY: No cough or wheezing, no sputum production. No hemoptysis. GASTROINTESTINAL: Decreased abdominal pain, appetite loss, blood in stools. No change in bowel habits. GENITOURINARY: No dysuria, trouble voiding, or hematuria. MUSCULOSKELETAL: no generalized weakness. No joint complaints. INTEGUMENTARY: No rash or pruritus. NEUROLOGICAL: No headache, diplopia. No change in gait, balance, or coordination. No paresthesia. ENDOCRINE: No temperature intolerance. No excessive thirst, fluid intake, or urination. HEMATOLOGIC/LYMPHATIC: No abnormal bruising or ecchymosis, blood clots or swollen lymph nodes. ALLERGIC/IMMUNOLOGIC: No nasal congestion or hives.      PHYSICAL EXAM:     /77   Pulse 61   Temp 97.4 °F (36.3 °C) (Oral)   Resp 16   Ht 5' 4\" (1.626 m)   Wt 130 lb (59 kg)   SpO2 96%   BMI 22.31 kg/m²     WEIGHT:   Wt Readings from Last 3 Encounters:   09/22/18 130 lb (59 kg)   09/04/18 151 lb (68.5 kg)   08/25/18 143 lb (64.9 kg) 8/25/2018 at 6:27 pm . The patient's surgical history is now available. The patient underwent hiatal hernia repair with Nissen fundoplication on 18/01/8137. The changes at the GE junction are are consistent with previous Nissen repair. If there is continued symptomatology, consider GI study. Xr Chest Portable    Result Date: 8/25/2018  EXAMINATION: SINGLE XRAY VIEW OF THE CHEST 8/25/2018 5:19 pm COMPARISON: 07/06/2009 HISTORY: ORDERING PHYSICIAN PROVIDED HISTORY: vomiting TECHNOLOGIST PROVIDED HISTORY: Technologist Provided Reason for Exam: NAUSEA AND VOMITING. FORMER SMOKER. HISTORY OF BLADDER CANCER, DVT AND POLYCYTHEMIA Acuity: Acute Type of Encounter: Initial History of bladder carcinoma, blood clots, Nissen fundoplication 27/17/6282 FINDINGS: No acute bony abnormality. The heart size and mediastinal contours are stable. The lungs are clear. Negative portable study. STAGING:     Cancer Staging  No matching staging information was found for the patient. ASSESSMENT AND PLAN:     · Nausea, vomiting and abdominal pain. Status post recent surgery for large hiatal hernia. Trouble eating. CT abdomen unremarkable. Symptoms improve. EGD tomorrow. · Hypokalemia. Improving with potassium. 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 Status post robotic assisted laparoscopic radical cystectomy with ileal conduit, bilateral pelvic lymphadenectomy, and umbilectomy on August 27, 2018. Final pathology revealed 4 cm mucinous adenocarcinoma, poorly differentiated, invasion of anterior vagina, margins were clear, free floating tumor cells were identified within the lumen of the urethral margin, definitive invasion and in situ carcinoma was not identified infiltrating the intact tissue. Lymphovascular invasion present, 18 lymph nodes negative. Plan radiation oncology consult. Seen by radiation oncology at [facility] but did not get a call back. · Polycythemia vera. 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 The goal is to lower her Hct to 42%. On Hydrea 500 mg three times a week. Counts under good control. · Right leg arterial ischemia and thrombosis, status post AKA. · CVA x 2.   · Chronic tobacco use. · Chronic pain syndrome. · Depression. On antidepressant. Reviewed labs. She has improved. Has ileal conduit leakage. Seen by urology. EGD was postponed until later in the week d/t pt being Plavix. 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 Ginger Petty [facility]  Oncology Hematology Care, [facility].  [phone]    Patient was seen in the morning. Feeling better. No nausea or vomiting. Less abdominal pain. EGD was postponed because of the Plavix. Blood counts stable. Discussed with the hospitalist.    Thuy Curry.  Mervat Dunn MD, [address]  Hematology and Oncology  [facility]  [phone]

## 2018-09-25 ENCOUNTER — APPOINTMENT (OUTPATIENT)
Dept: GENERAL RADIOLOGY | Age: 63
DRG: 422 | End: 2018-09-25
Payer: MEDICARE

## 2018-09-25 VITALS
RESPIRATION RATE: 16 BRPM | HEART RATE: 83 BPM | OXYGEN SATURATION: 98 % | BODY MASS INDEX: 22.2 KG/M2 | HEIGHT: 64 IN | WEIGHT: 130 LBS | DIASTOLIC BLOOD PRESSURE: 75 MMHG | SYSTOLIC BLOOD PRESSURE: 97 MMHG | TEMPERATURE: 99 F

## 2018-09-25 LAB
ANION GAP SERPL CALCULATED.3IONS-SCNC: 7 MMOL/L (ref 3–16)
BUN BLDV-MCNC: 12 MG/DL (ref 7–20)
CALCIUM SERPL-MCNC: 9.2 MG/DL (ref 8.3–10.6)
CHLORIDE BLD-SCNC: 101 MMOL/L (ref 99–110)
CO2: 29 MMOL/L (ref 21–32)
CREAT SERPL-MCNC: <0.5 MG/DL (ref 0.6–1.2)
GFR AFRICAN AMERICAN: >60
GFR NON-AFRICAN AMERICAN: >60
GLUCOSE BLD-MCNC: 89 MG/DL (ref 70–99)
POTASSIUM SERPL-SCNC: 4.3 MMOL/L (ref 3.5–5.1)
SODIUM BLD-SCNC: 137 MMOL/L (ref 136–145)

## 2018-09-25 PROCEDURE — APPSS15 APP SPLIT SHARED TIME 0-15 MINUTES: Performed by: NURSE PRACTITIONER

## 2018-09-25 PROCEDURE — 6370000000 HC RX 637 (ALT 250 FOR IP): Performed by: INTERNAL MEDICINE

## 2018-09-25 PROCEDURE — APPNB30 APP NON BILLABLE TIME 0-30 MINS: Performed by: NURSE PRACTITIONER

## 2018-09-25 PROCEDURE — 74240 X-RAY XM UPR GI TRC 1CNTRST: CPT

## 2018-09-25 PROCEDURE — 2580000003 HC RX 258: Performed by: INTERNAL MEDICINE

## 2018-09-25 PROCEDURE — 74220 X-RAY XM ESOPHAGUS 1CNTRST: CPT

## 2018-09-25 PROCEDURE — 80048 BASIC METABOLIC PNL TOTAL CA: CPT

## 2018-09-25 PROCEDURE — 99231 SBSQ HOSP IP/OBS SF/LOW 25: CPT | Performed by: SURGERY

## 2018-09-25 PROCEDURE — 36415 COLL VENOUS BLD VENIPUNCTURE: CPT

## 2018-09-25 PROCEDURE — 6360000002 HC RX W HCPCS: Performed by: INTERNAL MEDICINE

## 2018-09-25 RX ORDER — OXYCODONE AND ACETAMINOPHEN 7.5; 325 MG/1; MG/1
1 TABLET ORAL EVERY 4 HOURS PRN
Qty: 120 TABLET | Refills: 0 | Status: SHIPPED | OUTPATIENT
Start: 2018-09-25 | End: 2018-10-25

## 2018-09-25 RX ADMIN — DULOXETINE HYDROCHLORIDE 60 MG: 30 CAPSULE, DELAYED RELEASE ORAL at 11:12

## 2018-09-25 RX ADMIN — ALPRAZOLAM 0.5 MG: 0.5 TABLET ORAL at 11:11

## 2018-09-25 RX ADMIN — GABAPENTIN 800 MG: 400 CAPSULE ORAL at 11:11

## 2018-09-25 RX ADMIN — OXYCODONE HYDROCHLORIDE AND ACETAMINOPHEN 1 TABLET: 7.5; 325 TABLET ORAL at 07:23

## 2018-09-25 RX ADMIN — OXYCODONE HYDROCHLORIDE AND ACETAMINOPHEN 1 TABLET: 7.5; 325 TABLET ORAL at 02:58

## 2018-09-25 RX ADMIN — Medication 10 ML: at 11:12

## 2018-09-25 RX ADMIN — GABAPENTIN 800 MG: 400 CAPSULE ORAL at 13:44

## 2018-09-25 RX ADMIN — OXYCODONE HYDROCHLORIDE AND ACETAMINOPHEN 1 TABLET: 7.5; 325 TABLET ORAL at 11:15

## 2018-09-25 RX ADMIN — MORPHINE SULFATE 15 MG: 15 TABLET, FILM COATED, EXTENDED RELEASE ORAL at 09:44

## 2018-09-25 RX ADMIN — HEPARIN SODIUM 5000 UNITS: 5000 INJECTION INTRAVENOUS; SUBCUTANEOUS at 13:44

## 2018-09-25 RX ADMIN — OXYCODONE HYDROCHLORIDE AND ACETAMINOPHEN 1 TABLET: 7.5; 325 TABLET ORAL at 15:42

## 2018-09-25 RX ADMIN — METHOCARBAMOL 500 MG: 500 TABLET ORAL at 11:12

## 2018-09-25 ASSESSMENT — PAIN SCALES - GENERAL
PAINLEVEL_OUTOF10: 4
PAINLEVEL_OUTOF10: 3
PAINLEVEL_OUTOF10: 4
PAINLEVEL_OUTOF10: 5
PAINLEVEL_OUTOF10: 6
PAINLEVEL_OUTOF10: 1
PAINLEVEL_OUTOF10: 6

## 2018-09-25 ASSESSMENT — PAIN DESCRIPTION - PAIN TYPE
TYPE: CHRONIC PAIN
TYPE: CHRONIC PAIN

## 2018-09-25 ASSESSMENT — PAIN DESCRIPTION - LOCATION: LOCATION: ABDOMEN

## 2018-09-25 NOTE — PROGRESS NOTES
Dr. Veronica lobato for patient discharge. Spoke with John Evans, from GI, will consider doing outpatient EGD Thursday. Spoke with wound care nurse, Ela Bunn, will send up extra supplies for patient for urostomy bag.

## 2018-09-25 NOTE — PROGRESS NOTES
Acuity: Acute Type of Encounter: Initial FINDINGS: Lower Chest: No acute infiltrate at the lung bases. Organs: The gallbladder is surgically absent. Stable mild intrahepatic and extrahepatic biliary dilatation, likely physiologic. The liver, pancreas and adrenal glands are unremarkable. Stable mild splenomegaly with the spleen measuring 13.8 cm in AP dimension. No renal mass or significant hydronephrosis. GI/Bowel: Interval postoperative changes at the GE junction with reduction in the hiatal hernia. The reduced stomach appears rotated about the long axis of the stomach. Edematous changes are present within the distal esophagus and proximal stomach. No significant distention is seen although only the distal esophagus is included on the exam.  Postoperative clips are present. The remainder of the stomach and duodenal C-loop are unremarkable. No pericolonic inflammatory changes. No small bowel distension. Pelvis: No pelvic mass or free pelvic fluid. The uterus is absent. Mild mucosal thickening in the base of the bladder is stable as noted previously. Peritoneum/Retroperitoneum: The abdominal aorta is normal in caliber with moderate calcified aortoiliac plaque. No retroperitoneal adenopathy or free fluid. Left-sided IVC is noted. Bones/Soft Tissues: No acute osseous or soft tissue abnormality. 1. Postoperative change consistent with repair of hiatal hernia. The stomach appears rotated along its long axis proximally concerning for volvulus pattern. Edematous changes are present in the distal esophagus and stomach. 2. No other acute findings within the abdomen or pelvis. 3. Previous cholecystectomy and hysterectomy. 4. Stable thickening along the base of the urinary bladder consistent with the patient's history of malignancy. Critical results were called to Tala Stearns on 8/25/2018 at 6:27 pm . The patient's surgical history is now available.   The patient underwent hiatal hernia repair with Nissen vomiting  Leaking ileal conduit      Plan:  1. Barium swallow with UGI imaging to further evaluate dysphagia today  2. NPO for imaging  3. IV hydration until PO intake is adequate  4. Activity as tolerated, ambulate TID, up to chair for all meals  5. Pulmonary toilet, incentive spirometry  6. PRN analgesics and antiemetics--minimizing narcotics as tolerated  7. DVT prophylaxis with heparin injections Q3 per primary, holding Plavix for EGD on 9/27/2018  8. Management of medical comorbid etiologies per primary team and consulting services    EDUCATION:  Educated patient on plan of care and disease process--all questions answered. Plans discussed with patient and nursing. Reviewed and discussed with Dr. Rebekah Gordon. Signed:  Nathanael Dempsey, MARINA - CNP  9/25/2018 11:31 AM     Surg Staff:   Pt seen and examined with Np  See full note above  Abd exam continues to be benign, incisions OK  Urine bag with better seal and no leak  Swallowing and eating OK  UGI looks fine, no post op issues there - had large intrathoracic stomach / paraesophageal hernia and volvulus with obstruction. These issues are better now.   Probably EGD per Dr. Jaycob Salmon to come  Diet as tolerated from my perspective    Cheryl Robison

## 2018-09-25 NOTE — PLAN OF CARE
Problem: Falls - Risk of:  Goal: Will remain free from falls  Will remain free from falls   Outcome: Ongoing  Free of falls. Call light in reach. Bed in lowest position. Transfers self to wheelchair. Personal items in reach. Problem: Fluid Volume - Imbalance:  Goal: Absence of imbalanced fluid volume signs and symptoms  Absence of imbalanced fluid volume signs and symptoms   Outcome: Ongoing  No signs or symptoms of fluid imbalance. Will continue to monitor. Problem: Health Maintenance - Impaired:  Goal: Ability to perform activities of daily living will improve  Ability to perform activities of daily living will improve   Outcome: Ongoing  Patient is improving activities of daily living. Will continue to monitor. Problem: Mood - Altered:  Goal: Mood stable  Mood stable   Outcome: Ongoing  Mood is stable and no signs or symptoms of depression. Patient is able to express needs. Problem: Pain:  Goal: Control of acute pain  Control of acute pain   Outcome: Ongoing  Pain controlled with medication. Patient satisfied with current treatment plan      Problem: Risk for Impaired Skin Integrity  Goal: Tissue integrity - skin and mucous membranes  Structural intactness and normal physiological function of skin and  mucous membranes. Outcome: Ongoing  Patient has urostomy to gravity drainage. No skin integrity issues noted. Problem: Nutrition  Goal: Optimal nutrition therapy  Outcome: Ongoing  Appetite has improved. Some difficulty with swallowing at times per patient. Tests ordered.

## 2018-09-26 LAB
BLOOD CULTURE, ROUTINE: NORMAL
CULTURE, BLOOD 2: NORMAL

## 2018-10-10 NOTE — DISCHARGE SUMMARY
treatment. She underwent Upper GI which was unremarkable. Significant Diagnostic Studies:   1. CT Abdomen and Pelvis:   Small fluid collection left lateral pelvic wall of indeterminate sterility.       No acute abnormality of the abdomen pelvis otherwise. 2. UGI:      No strictures or obstructing mass in the esophagus.  Postsurgical change from   Nissen fundoplication is seen. [place] is mild esophageal reflux       Questionable mild small bowel wall thickening in the left upper quadrant. This could be due to incomplete distention or underlying enteritis. Discharged Condition: good  BP 97/75   Pulse 83   Temp 99 °F (37.2 °C) (Oral)   Resp 16   Ht 5' 4\" (1.626 m)   Wt 130 lb (59 kg)   SpO2 98%   BMI 22.31 kg/m²   CBC: No results for input(s): WBC in the last 72 hours. Invalid input(s):  HGB,  PLT  BMP: No results for input(s): NA in the last 72 hours. Invalid input(s):  K,  CL,  CO2,  BUN,  CREATININE,  GLUCOSE    Disposition: home    Discharge Instructions: Activity: activity as tolerated  Diet: regular diet  Wound Care: as directed  Follow Up: Iain Hager MD in 1 week. Call to schedule appointment    Total time spent in arranging discharge and seeing patient is more than 30 minutes.     Attending Physician:   Iain Hager MD, 10/9/2018, 8:23 PM

## 2018-10-21 PROBLEM — E86.0 DEHYDRATION: Status: RESOLVED | Noted: 2018-09-21 | Resolved: 2018-10-21

## 2018-11-16 ENCOUNTER — HOSPITAL ENCOUNTER (INPATIENT)
Age: 63
LOS: 1 days | Discharge: HOME | DRG: 253 | End: 2018-11-17
Attending: EMERGENCY MEDICINE | Admitting: INTERNAL MEDICINE
Payer: MEDICARE

## 2018-11-16 ENCOUNTER — APPOINTMENT (OUTPATIENT)
Dept: CT IMAGING | Age: 63
DRG: 253 | End: 2018-11-16
Payer: MEDICARE

## 2018-11-16 DIAGNOSIS — K62.5 RECTAL BLEEDING: Primary | ICD-10-CM

## 2018-11-16 DIAGNOSIS — N30.00 ACUTE CYSTITIS WITHOUT HEMATURIA: ICD-10-CM

## 2018-11-16 PROBLEM — C79.51 BONY METASTASIS (HCC): Status: ACTIVE | Noted: 2018-11-16

## 2018-11-16 PROBLEM — K92.2 GIB (GASTROINTESTINAL BLEEDING): Status: ACTIVE | Noted: 2018-11-16

## 2018-11-16 LAB
A/G RATIO: 1.1 (ref 1.1–2.2)
ABO/RH: NORMAL
ALBUMIN SERPL-MCNC: 3.5 G/DL (ref 3.4–5)
ALP BLD-CCNC: 185 U/L (ref 40–129)
ALT SERPL-CCNC: 23 U/L (ref 10–40)
ANION GAP SERPL CALCULATED.3IONS-SCNC: 12 MMOL/L (ref 3–16)
ANTIBODY SCREEN: NORMAL
AST SERPL-CCNC: 18 U/L (ref 15–37)
BACTERIA: ABNORMAL /HPF
BASOPHILS ABSOLUTE: 0 K/UL (ref 0–0.2)
BASOPHILS RELATIVE PERCENT: 0.5 %
BILIRUB SERPL-MCNC: <0.2 MG/DL (ref 0–1)
BILIRUBIN URINE: NEGATIVE
BLOOD, URINE: ABNORMAL
BUN BLDV-MCNC: 12 MG/DL (ref 7–20)
CALCIUM SERPL-MCNC: 9.1 MG/DL (ref 8.3–10.6)
CASTS: ABNORMAL /LPF
CHLORIDE BLD-SCNC: 105 MMOL/L (ref 99–110)
CLARITY: ABNORMAL
CO2: 20 MMOL/L (ref 21–32)
COLOR: YELLOW
CREAT SERPL-MCNC: 0.7 MG/DL (ref 0.6–1.2)
EOSINOPHILS ABSOLUTE: 0.1 K/UL (ref 0–0.6)
EOSINOPHILS RELATIVE PERCENT: 0.6 %
EPITHELIAL CELLS, UA: 2 /HPF (ref 0–5)
GFR AFRICAN AMERICAN: >60
GFR NON-AFRICAN AMERICAN: >60
GLOBULIN: 3.3 G/DL
GLUCOSE BLD-MCNC: 107 MG/DL (ref 70–99)
GLUCOSE URINE: NEGATIVE MG/DL
HCT VFR BLD CALC: 51.9 % (ref 36–48)
HEMOGLOBIN: 16.5 G/DL (ref 12–16)
INR BLD: 0.96 (ref 0.86–1.14)
KETONES, URINE: NEGATIVE MG/DL
LEUKOCYTE ESTERASE, URINE: ABNORMAL
LYMPHOCYTES ABSOLUTE: 1.7 K/UL (ref 1–5.1)
LYMPHOCYTES RELATIVE PERCENT: 20.3 %
MCH RBC QN AUTO: 29.9 PG (ref 26–34)
MCHC RBC AUTO-ENTMCNC: 31.8 G/DL (ref 31–36)
MCV RBC AUTO: 94.1 FL (ref 80–100)
MICROSCOPIC EXAMINATION: YES
MONOCYTES ABSOLUTE: 0.3 K/UL (ref 0–1.3)
MONOCYTES RELATIVE PERCENT: 4.2 %
NEUTROPHILS ABSOLUTE: 6.1 K/UL (ref 1.7–7.7)
NEUTROPHILS RELATIVE PERCENT: 74.4 %
NITRITE, URINE: POSITIVE
PDW BLD-RTO: 19.3 % (ref 12.4–15.4)
PH UA: 7.5
PLATELET # BLD: 361 K/UL (ref 135–450)
PMV BLD AUTO: 8.7 FL (ref 5–10.5)
POTASSIUM SERPL-SCNC: 4 MMOL/L (ref 3.5–5.1)
PROTEIN UA: ABNORMAL MG/DL
PROTHROMBIN TIME: 11 SEC (ref 9.8–13)
RBC # BLD: 5.51 M/UL (ref 4–5.2)
RBC UA: 19 /HPF (ref 0–4)
SODIUM BLD-SCNC: 137 MMOL/L (ref 136–145)
SPECIFIC GRAVITY UA: >1.03
TOTAL PROTEIN: 6.8 G/DL (ref 6.4–8.2)
URINE REFLEX TO CULTURE: YES
URINE TYPE: ABNORMAL
UROBILINOGEN, URINE: 0.2 E.U./DL
WBC # BLD: 8.2 K/UL (ref 4–11)
WBC UA: 52 /HPF (ref 0–5)

## 2018-11-16 PROCEDURE — 99284 EMERGENCY DEPT VISIT MOD MDM: CPT

## 2018-11-16 PROCEDURE — 6370000000 HC RX 637 (ALT 250 FOR IP): Performed by: INTERNAL MEDICINE

## 2018-11-16 PROCEDURE — 2580000003 HC RX 258: Performed by: INTERNAL MEDICINE

## 2018-11-16 PROCEDURE — 86900 BLOOD TYPING SEROLOGIC ABO: CPT

## 2018-11-16 PROCEDURE — 74177 CT ABD & PELVIS W/CONTRAST: CPT

## 2018-11-16 PROCEDURE — 86850 RBC ANTIBODY SCREEN: CPT

## 2018-11-16 PROCEDURE — 85610 PROTHROMBIN TIME: CPT

## 2018-11-16 PROCEDURE — 85025 COMPLETE CBC W/AUTO DIFF WBC: CPT

## 2018-11-16 PROCEDURE — 86901 BLOOD TYPING SEROLOGIC RH(D): CPT

## 2018-11-16 PROCEDURE — 80053 COMPREHEN METABOLIC PANEL: CPT

## 2018-11-16 PROCEDURE — 36415 COLL VENOUS BLD VENIPUNCTURE: CPT

## 2018-11-16 PROCEDURE — 6360000004 HC RX CONTRAST MEDICATION: Performed by: EMERGENCY MEDICINE

## 2018-11-16 PROCEDURE — 81001 URINALYSIS AUTO W/SCOPE: CPT

## 2018-11-16 PROCEDURE — 1200000000 HC SEMI PRIVATE

## 2018-11-16 PROCEDURE — 87086 URINE CULTURE/COLONY COUNT: CPT

## 2018-11-16 RX ORDER — HYDROXYZINE HYDROCHLORIDE 25 MG/1
25 TABLET, FILM COATED ORAL 3 TIMES DAILY PRN
COMMUNITY

## 2018-11-16 RX ORDER — METHOCARBAMOL 500 MG/1
500 TABLET, FILM COATED ORAL 2 TIMES DAILY
Status: DISCONTINUED | OUTPATIENT
Start: 2018-11-17 | End: 2018-11-16 | Stop reason: ALTCHOICE

## 2018-11-16 RX ORDER — ALPRAZOLAM 0.5 MG/1
0.5 TABLET ORAL 2 TIMES DAILY
Status: DISCONTINUED | OUTPATIENT
Start: 2018-11-16 | End: 2018-11-17 | Stop reason: HOSPADM

## 2018-11-16 RX ORDER — SODIUM CHLORIDE 9 MG/ML
INJECTION, SOLUTION INTRAVENOUS CONTINUOUS
Status: DISCONTINUED | OUTPATIENT
Start: 2018-11-16 | End: 2018-11-17 | Stop reason: HOSPADM

## 2018-11-16 RX ORDER — CEFDINIR 300 MG/1
300 CAPSULE ORAL 2 TIMES DAILY
COMMUNITY
End: 2018-12-10 | Stop reason: ALTCHOICE

## 2018-11-16 RX ORDER — MORPHINE SULFATE 2 MG/ML
2 INJECTION, SOLUTION INTRAMUSCULAR; INTRAVENOUS
Status: DISCONTINUED | OUTPATIENT
Start: 2018-11-16 | End: 2018-11-17 | Stop reason: HOSPADM

## 2018-11-16 RX ORDER — HYDROCODONE BITARTRATE AND ACETAMINOPHEN 5; 325 MG/1; MG/1
2 TABLET ORAL EVERY 4 HOURS PRN
Status: DISCONTINUED | OUTPATIENT
Start: 2018-11-16 | End: 2018-11-17 | Stop reason: HOSPADM

## 2018-11-16 RX ORDER — ATORVASTATIN CALCIUM 40 MG/1
40 TABLET, FILM COATED ORAL NIGHTLY
Status: DISCONTINUED | OUTPATIENT
Start: 2018-11-16 | End: 2018-11-17 | Stop reason: HOSPADM

## 2018-11-16 RX ORDER — DULOXETIN HYDROCHLORIDE 30 MG/1
60 CAPSULE, DELAYED RELEASE ORAL DAILY
Status: DISCONTINUED | OUTPATIENT
Start: 2018-11-17 | End: 2018-11-17 | Stop reason: HOSPADM

## 2018-11-16 RX ORDER — POTASSIUM CHLORIDE 20 MEQ/1
20 TABLET, EXTENDED RELEASE ORAL DAILY
COMMUNITY

## 2018-11-16 RX ORDER — SODIUM CHLORIDE 0.9 % (FLUSH) 0.9 %
10 SYRINGE (ML) INJECTION EVERY 12 HOURS SCHEDULED
Status: DISCONTINUED | OUTPATIENT
Start: 2018-11-16 | End: 2018-11-17 | Stop reason: HOSPADM

## 2018-11-16 RX ORDER — DIPHENOXYLATE HYDROCHLORIDE AND ATROPINE SULFATE 2.5; .025 MG/1; MG/1
1 TABLET ORAL 3 TIMES DAILY PRN
COMMUNITY

## 2018-11-16 RX ORDER — OMEPRAZOLE 20 MG/1
40 CAPSULE, DELAYED RELEASE ORAL DAILY
Status: DISCONTINUED | OUTPATIENT
Start: 2018-11-17 | End: 2018-11-16 | Stop reason: CLARIF

## 2018-11-16 RX ORDER — MORPHINE SULFATE 15 MG/1
15 TABLET, FILM COATED, EXTENDED RELEASE ORAL 2 TIMES DAILY
Status: DISCONTINUED | OUTPATIENT
Start: 2018-11-16 | End: 2018-11-17 | Stop reason: HOSPADM

## 2018-11-16 RX ORDER — MORPHINE SULFATE 4 MG/ML
4 INJECTION, SOLUTION INTRAMUSCULAR; INTRAVENOUS
Status: DISCONTINUED | OUTPATIENT
Start: 2018-11-16 | End: 2018-11-17 | Stop reason: HOSPADM

## 2018-11-16 RX ORDER — GABAPENTIN 400 MG/1
800 CAPSULE ORAL 3 TIMES DAILY
Status: DISCONTINUED | OUTPATIENT
Start: 2018-11-16 | End: 2018-11-17 | Stop reason: HOSPADM

## 2018-11-16 RX ORDER — HYDROCODONE BITARTRATE AND ACETAMINOPHEN 5; 325 MG/1; MG/1
1 TABLET ORAL EVERY 4 HOURS PRN
Status: DISCONTINUED | OUTPATIENT
Start: 2018-11-16 | End: 2018-11-17 | Stop reason: HOSPADM

## 2018-11-16 RX ORDER — ACETAMINOPHEN 325 MG/1
650 TABLET ORAL EVERY 4 HOURS PRN
Status: DISCONTINUED | OUTPATIENT
Start: 2018-11-16 | End: 2018-11-17 | Stop reason: HOSPADM

## 2018-11-16 RX ORDER — SODIUM CHLORIDE 0.9 % (FLUSH) 0.9 %
10 SYRINGE (ML) INJECTION PRN
Status: DISCONTINUED | OUTPATIENT
Start: 2018-11-16 | End: 2018-11-17 | Stop reason: HOSPADM

## 2018-11-16 RX ORDER — ROPINIROLE 0.5 MG/1
0.5 TABLET, FILM COATED ORAL 2 TIMES DAILY
COMMUNITY

## 2018-11-16 RX ORDER — ONDANSETRON 2 MG/ML
4 INJECTION INTRAMUSCULAR; INTRAVENOUS EVERY 6 HOURS PRN
Status: DISCONTINUED | OUTPATIENT
Start: 2018-11-16 | End: 2018-11-17 | Stop reason: HOSPADM

## 2018-11-16 RX ORDER — POTASSIUM CHLORIDE 7.45 MG/ML
10 INJECTION INTRAVENOUS PRN
Status: DISCONTINUED | OUTPATIENT
Start: 2018-11-16 | End: 2018-11-17 | Stop reason: HOSPADM

## 2018-11-16 RX ORDER — POTASSIUM CHLORIDE 20 MEQ/1
40 TABLET, EXTENDED RELEASE ORAL PRN
Status: DISCONTINUED | OUTPATIENT
Start: 2018-11-16 | End: 2018-11-17 | Stop reason: HOSPADM

## 2018-11-16 RX ORDER — PANTOPRAZOLE SODIUM 40 MG/1
40 TABLET, DELAYED RELEASE ORAL DAILY
COMMUNITY

## 2018-11-16 RX ADMIN — IOPAMIDOL 75 ML: 755 INJECTION, SOLUTION INTRAVENOUS at 20:16

## 2018-11-16 RX ADMIN — SODIUM CHLORIDE: 9 INJECTION, SOLUTION INTRAVENOUS at 23:31

## 2018-11-16 RX ADMIN — GABAPENTIN 800 MG: 400 CAPSULE ORAL at 23:31

## 2018-11-16 RX ADMIN — MORPHINE SULFATE 15 MG: 15 TABLET, FILM COATED, EXTENDED RELEASE ORAL at 23:31

## 2018-11-16 RX ADMIN — Medication 10 ML: at 23:36

## 2018-11-16 RX ADMIN — DESMOPRESSIN ACETATE 40 MG: 0.2 TABLET ORAL at 23:31

## 2018-11-16 RX ADMIN — ALPRAZOLAM 0.5 MG: 0.5 TABLET ORAL at 23:31

## 2018-11-16 ASSESSMENT — ENCOUNTER SYMPTOMS
SHORTNESS OF BREATH: 0
ABDOMINAL PAIN: 1
RECTAL PAIN: 0
BLOOD IN STOOL: 1
NAUSEA: 0
VOMITING: 0
COUGH: 0
WHEEZING: 0
RHINORRHEA: 0
DIARRHEA: 0

## 2018-11-16 ASSESSMENT — PAIN SCALES - GENERAL
PAINLEVEL_OUTOF10: 8
PAINLEVEL_OUTOF10: 8
PAINLEVEL_OUTOF10: 7

## 2018-11-16 ASSESSMENT — PAIN DESCRIPTION - LOCATION: LOCATION: ABDOMEN

## 2018-11-16 ASSESSMENT — PAIN DESCRIPTION - PAIN TYPE: TYPE: ACUTE PAIN;CHRONIC PAIN

## 2018-11-17 VITALS
HEART RATE: 69 BPM | DIASTOLIC BLOOD PRESSURE: 69 MMHG | HEIGHT: 64 IN | RESPIRATION RATE: 18 BRPM | WEIGHT: 137.5 LBS | TEMPERATURE: 97.9 F | OXYGEN SATURATION: 93 % | SYSTOLIC BLOOD PRESSURE: 101 MMHG | BODY MASS INDEX: 23.47 KG/M2

## 2018-11-17 LAB
ANION GAP SERPL CALCULATED.3IONS-SCNC: 8 MMOL/L (ref 3–16)
BASOPHILS ABSOLUTE: 0 K/UL (ref 0–0.2)
BASOPHILS RELATIVE PERCENT: 0.3 %
BUN BLDV-MCNC: 11 MG/DL (ref 7–20)
CALCIUM SERPL-MCNC: 8.6 MG/DL (ref 8.3–10.6)
CHLORIDE BLD-SCNC: 108 MMOL/L (ref 99–110)
CO2: 25 MMOL/L (ref 21–32)
CREAT SERPL-MCNC: 0.6 MG/DL (ref 0.6–1.2)
EOSINOPHILS ABSOLUTE: 0.1 K/UL (ref 0–0.6)
EOSINOPHILS RELATIVE PERCENT: 1.4 %
GFR AFRICAN AMERICAN: >60
GFR NON-AFRICAN AMERICAN: >60
GLUCOSE BLD-MCNC: 82 MG/DL (ref 70–99)
HCT VFR BLD CALC: 45.4 % (ref 36–48)
HEMOGLOBIN: 14.7 G/DL (ref 12–16)
HEMOGLOBIN: 15.5 G/DL (ref 12–16)
LYMPHOCYTES ABSOLUTE: 1.4 K/UL (ref 1–5.1)
LYMPHOCYTES RELATIVE PERCENT: 17.4 %
MCH RBC QN AUTO: 29.8 PG (ref 26–34)
MCHC RBC AUTO-ENTMCNC: 32.5 G/DL (ref 31–36)
MCV RBC AUTO: 91.9 FL (ref 80–100)
MONOCYTES ABSOLUTE: 0.2 K/UL (ref 0–1.3)
MONOCYTES RELATIVE PERCENT: 2.7 %
NEUTROPHILS ABSOLUTE: 6.1 K/UL (ref 1.7–7.7)
NEUTROPHILS RELATIVE PERCENT: 78.2 %
PDW BLD-RTO: 19.4 % (ref 12.4–15.4)
PLATELET # BLD: 316 K/UL (ref 135–450)
PMV BLD AUTO: 8.5 FL (ref 5–10.5)
POTASSIUM SERPL-SCNC: 3.9 MMOL/L (ref 3.5–5.1)
RBC # BLD: 4.94 M/UL (ref 4–5.2)
SODIUM BLD-SCNC: 141 MMOL/L (ref 136–145)
WBC # BLD: 7.8 K/UL (ref 4–11)

## 2018-11-17 PROCEDURE — 6360000002 HC RX W HCPCS: Performed by: EMERGENCY MEDICINE

## 2018-11-17 PROCEDURE — 2580000003 HC RX 258: Performed by: INTERNAL MEDICINE

## 2018-11-17 PROCEDURE — 6370000000 HC RX 637 (ALT 250 FOR IP): Performed by: INTERNAL MEDICINE

## 2018-11-17 PROCEDURE — C9113 INJ PANTOPRAZOLE SODIUM, VIA: HCPCS | Performed by: INTERNAL MEDICINE

## 2018-11-17 PROCEDURE — 36415 COLL VENOUS BLD VENIPUNCTURE: CPT

## 2018-11-17 PROCEDURE — 85018 HEMOGLOBIN: CPT

## 2018-11-17 PROCEDURE — 6360000002 HC RX W HCPCS: Performed by: INTERNAL MEDICINE

## 2018-11-17 PROCEDURE — 2500000003 HC RX 250 WO HCPCS: Performed by: EMERGENCY MEDICINE

## 2018-11-17 PROCEDURE — 85025 COMPLETE CBC W/AUTO DIFF WBC: CPT

## 2018-11-17 PROCEDURE — 80048 BASIC METABOLIC PNL TOTAL CA: CPT

## 2018-11-17 RX ADMIN — DULOXETINE HYDROCHLORIDE 60 MG: 30 CAPSULE, DELAYED RELEASE ORAL at 07:57

## 2018-11-17 RX ADMIN — MORPHINE SULFATE 2 MG: 2 INJECTION, SOLUTION INTRAMUSCULAR; INTRAVENOUS at 01:54

## 2018-11-17 RX ADMIN — GABAPENTIN 800 MG: 400 CAPSULE ORAL at 07:57

## 2018-11-17 RX ADMIN — ALPRAZOLAM 0.5 MG: 0.5 TABLET ORAL at 07:58

## 2018-11-17 RX ADMIN — HYDROCODONE BITARTRATE AND ACETAMINOPHEN 2 TABLET: 5; 325 TABLET ORAL at 10:53

## 2018-11-17 RX ADMIN — MORPHINE SULFATE 2 MG: 2 INJECTION, SOLUTION INTRAMUSCULAR; INTRAVENOUS at 07:57

## 2018-11-17 RX ADMIN — Medication 10 ML: at 07:58

## 2018-11-17 RX ADMIN — SODIUM CHLORIDE 8 MG/HR: 9 INJECTION, SOLUTION INTRAVENOUS at 02:14

## 2018-11-17 RX ADMIN — MORPHINE SULFATE 15 MG: 15 TABLET, FILM COATED, EXTENDED RELEASE ORAL at 07:57

## 2018-11-17 RX ADMIN — SODIUM CHLORIDE: 9 INJECTION, SOLUTION INTRAVENOUS at 06:12

## 2018-11-17 RX ADMIN — CEFTRIAXONE SODIUM 1 G: 10 INJECTION, POWDER, FOR SOLUTION INTRAVENOUS at 01:44

## 2018-11-17 ASSESSMENT — ENCOUNTER SYMPTOMS
ABDOMINAL PAIN: 1
COUGH: 0
BLOOD IN STOOL: 1
CHOKING: 0
EYE ITCHING: 0
BACK PAIN: 1
EYE PAIN: 0

## 2018-11-17 ASSESSMENT — PAIN SCALES - GENERAL
PAINLEVEL_OUTOF10: 10
PAINLEVEL_OUTOF10: 2
PAINLEVEL_OUTOF10: 2
PAINLEVEL_OUTOF10: 7
PAINLEVEL_OUTOF10: 6

## 2018-11-17 NOTE — ED PROVIDER NOTES
Cardiovascular: Negative for chest pain. Gastrointestinal: Positive for abdominal pain and blood in stool. Negative for diarrhea, nausea, rectal pain and vomiting. Genitourinary: Negative for difficulty urinating, dysuria and hematuria. Musculoskeletal: Negative for neck pain and neck stiffness. Skin: Negative for rash. Neurological: Negative for dizziness, syncope, weakness, light-headedness and headaches. Positives and Pertinent negatives as per HPI. Except as noted abovein the ROS, all other systems were reviewed and negative. PAST MEDICAL HISTORY     Past Medical History:   Diagnosis Date    Bladder cancer ([address]     Blood clot in vein     right leg    Depression     Hx of blood clots     Polycythemia          SURGICAL HISTORY     Past Surgical History:   Procedure Laterality Date    ABOVE KNEE AMPUTATION Right     BLADDER REMOVAL  08/27/2018    Robotic assisted laparoscopic radical cystectomy with ileal conduit, bilateral pelvic lymphadenectomy, umbilectomy, and layered closure of the abdominal wall    CARPAL TUNNEL RELEASE      BILAT    CHOLECYSTECTOMY      FOOT SURGERY      BILAT FOR RECONSTRUCTION    HIATAL HERNIA REPAIR  08/03/2018    LAPAROSCOPIC HERNIA HIATAL WITH NISSEN FUNDOPLICATION    HYSTERECTOMY      PARTIAL    PILONIDAL CYST DRAINAGE           CURRENTMEDICATIONS       Previous Medications    ALPRAZOLAM (XANAX) 0.5 MG TABLET    Take 0.5 mg by mouth 2 times daily. .    ATORVASTATIN (LIPITOR) 40 MG TABLET    Take 1 tablet by mouth daily    CLOPIDOGREL (PLAVIX) 75 MG TABLET    Take 75 mg by mouth daily    DULOXETINE (CYMBALTA) 60 MG EXTENDED RELEASE CAPSULE    Take 60 mg by mouth daily    GABAPENTIN (NEURONTIN) 800 MG TABLET    Take 800 mg by mouth 3 times daily. Vannie Myers HYDROXYZINE (ATARAX) 25 MG TABLET    Take 25 mg by mouth 3 times daily as needed for Itching    MORPHINE (MS CONTIN) 15 MG EXTENDED RELEASE TABLET    Take 15 mg by mouth 2 times daily. Vannie Myers     NICOTINE (NICODERM CQ) 7 MG/24HR    Place 1 patch onto the skin    PANTOPRAZOLE (PROTONIX) 40 MG TABLET    Take 40 mg by mouth daily    POTASSIUM CHLORIDE (KLOR-CON M) 20 MEQ EXTENDED RELEASE TABLET    Take 20 mEq by mouth daily    ROPINIROLE (REQUIP) 0.5 MG TABLET    Take 0.5 mg by mouth 2 times daily    RUXOLITINIB PHOSPHATE (JAKAFI) 20 MG TABLET    Take 20 mg by mouth 2 times daily    SERTRALINE (ZOLOFT) 50 MG TABLET    Take 50 mg by mouth daily         ALLERGIES     Patient has no known allergies. FAMILYHISTORY       Family History   Problem Relation Age of Onset    Cancer Mother     Heart Disease Father     Diabetes Brother           SOCIAL HISTORY       Social History     Social History    Marital status: Single     Spouse name: N/A    Number of children: N/A    Years of education: N/A     Social History Main Topics    Smoking status: Former Smoker     Packs/day: 1.00     Years: 40.00     Types: Cigarettes     Quit date: 7/19/2018    Smokeless tobacco: Never Used    Alcohol use No    Drug use: No    Sexual activity: Not Asked     Other Topics Concern    None     Social History Narrative    None       SCREENINGS             PHYSICAL EXAM    (up to 7 for level 4, 8 or more for level 5)     ED Triage Vitals [11/16/18 1835]   BP Temp Temp Source Pulse Resp SpO2 Height Weight   134/78 99.2 °F (37.3 °C) Oral 105 16 96 % 5' 4\" (1.626 m) 139 lb (63 kg)       Physical Exam   Constitutional: She is oriented to person, place, and time. She appears well-developed and well-nourished. HENT:   Head: Normocephalic and atraumatic. Right Ear: External ear normal.   Left Ear: External ear normal.   Nose: Nose normal.   Eyes: Right eye exhibits no discharge. Left eye exhibits no discharge. Neck: Normal range of motion. Neck supple. Cardiovascular: Normal rate, regular rhythm and normal heart sounds. No murmur heard. Pulmonary/Chest: Effort normal and breath sounds normal. No respiratory distress.  She has no Patient presents for evaluation of rectal bleeding that began thi evening. On exam, she is well-appearing and in no acute distress. Vitals are stable and she is afebrile. Lungs clear to auscultation bilaterally. Generalized lower abdominal pain and reports is chronic. No peritoneal signs. Cystostomy in place. Rectal exam remarkable for right red blood in depends. No obvious external hemorrhoids or fissures with active bleeding noted. CBC and CMP are unremarkable. No acute anemia. Coags are within normal limits. CT of abdomen and pelvis shows no acute abnormality identified, however, there is permeative appearance of the proximal right femoral diaphysis concerning for metastatic disease. Due to concern for lower GI bleed with bright red blood in patient's anticoagulated status, I do believe she warrants admission for further evaluation and GI consultation. I spoke with the admitting physician, Dr. Meghana Mari, who is agreeable to this plan will resume care of the patient at this time. Patient was also informed and is agreeable. She is stable for admission. FINAL IMPRESSION      1.  Rectal bleeding          DISPOSITION/PLAN   DISPOSITION        PATIENT REFERREDTO:  MD Pasquale Downing [address] [id number] [phone]            DISCHARGE MEDICATIONS:  New Prescriptions    No medications on file       DISCONTINUED MEDICATIONS:  Discontinued Medications    METHOCARBAMOL (ROBAXIN) 500 MG TABLET    Take 500 mg by mouth 2 times daily    OMEPRAZOLE (PRILOSEC) 20 MG DELAYED RELEASE CAPSULE    Take 40 mg by mouth daily              (Please note that portions ofthis note were completed with a voice recognition program.  Efforts were made to edit the dictations but occasionally words are mis-transcribed.)    Jorge Jarvis PA-C (electronically signed)           ALESIA Young  11/16/18 5964       [place], [place]  11/16/18 5261

## 2018-11-17 NOTE — DISCHARGE SUMMARY
[facility] -- Physician Discharge Summary     Mikayla Sagastume  1955  MRN: [id number]    Admit Date: 11/16/2018  Discharge Date: No discharge date for patient encounter. Attending MD: Blayne Becerra MD  Discharging MD: Blayne Becerra MD  PCP: Daryle Stains, MD [address] [facility] [address] [phone]    Admission Diagnosis: Rectal bleed [K62.5]  Rectal bleed [K62.5]  DISCHARGE DIAGNOSIS: GI bleed    Full Hospital Problem List:  ANA/Lianne Schwab 1106 Problems    Diagnosis Date Noted    GIB (gastrointestinal bleeding) [K92.2] 11/16/2018    Bony [address] [facility] [C79.51] 11/16/2018    Rectal bleed [K62.5] 11/16/2018    GERD (gastroesophageal reflux disease) [K21.9] 09/21/2018    High cholesterol [E78.00] 09/21/2018    Carcinoma of urachus ([facility] [C67.7]            Hospital Course:  61 y. o. female , with history of bladder and vaginal cancer presents for evaluation of bright red rectal bleeding that began this evening. Patient states that she recently had her bladder removed and also had part of her vagina cut out but she still has cancer and is getting her to start radiation therapy.      She does have some lower abdominal pain, but states this is chronic. Rated 4/10 at this time. Described as a dull ache. Not increased today. No nausea, vomiting or diarrhea. She states that she went to have a bowel movement and bright red liquid blood, just poured out of her rectum when she was urinating. No history of similar symptoms. No pain associated with this. She denies chest pain or shortness of breath. No dizziness/lightheadedness, weakness, visual disturbances or syncope. She is anticoagulated on Eliquis.      While being worked up in [place] [address] - ct abd incidentally shows  Permeative appearance of the proximal right femoral diaphysis, which given  the patient's history is concerning for metastatic disease. Pt is seen by GI  Case d/w dr Darrel Gamboa  His recs:    Assessment:      1.

## 2018-11-17 NOTE — H&P
fatigue and fever. HENT: Negative for ear discharge and ear pain. Eyes: Negative for pain and itching. Respiratory: Negative for cough and choking. Cardiovascular: Negative for chest pain and leg swelling. Gastrointestinal: Positive for abdominal pain and blood in stool. Endocrine: Negative for cold intolerance and heat intolerance. Genitourinary: Negative for flank pain and hematuria. Musculoskeletal: Positive for back pain. Negative for neck stiffness. Skin: Negative for pallor. Neurological: Negative for tremors and syncope. Hematological: Negative for adenopathy. Psychiatric/Behavioral: Negative for decreased concentration. The patient is not nervous/anxious. Past Medical History:   Past Medical History:   Diagnosis Date    Bladder cancer ([address]     Blood clot in vein     right leg    Depression     Hx of blood clots     Polycythemia        Past Surgical History:   Past Surgical History:   Procedure Laterality Date    ABOVE KNEE AMPUTATION Right     BLADDER REMOVAL  08/27/2018    Robotic assisted laparoscopic radical cystectomy with ileal conduit, bilateral pelvic lymphadenectomy, umbilectomy, and layered closure of the abdominal wall    CARPAL TUNNEL RELEASE      BILAT    CHOLECYSTECTOMY      FOOT SURGERY      BILAT FOR RECONSTRUCTION    HIATAL HERNIA REPAIR  08/03/2018    LAPAROSCOPIC HERNIA HIATAL WITH NISSEN FUNDOPLICATION    HYSTERECTOMY      PARTIAL    PILONIDAL CYST DRAINAGE         Social History:   Social History     Social History    Marital status: Single     Spouse name: N/A    Number of children: N/A    Years of education: N/A     Occupational History    Not on file.      Social History Main Topics    Smoking status: Former Smoker     Packs/day: 1.00     Years: 40.00     Types: Cigarettes     Quit date: 7/19/2018    Smokeless tobacco: Never Used    Alcohol use No    Drug use: No    Sexual activity: Not on file     Other Topics Concern    Not midline, patent without discharge  Neck: no JVD, supple, symmetrical, trachea midline and thyroid not enlarged, symmetric, no tenderness/mass/nodules   Respiratory: clear to auscultation without wheezes or rales  Cardiovascular: normal rate, regular rhythm, normal S1 and S2 and no carotid bruits  Gastrointestinal: soft, mild diffuse tenderness, bs+  Lymphatic: No cervical, supraclavicular or axillary lymphadenopathy noted. Extremities: no edema, no clubbing  Skin:No rashes or nodules noted. Neurologic:    LABS:  Labs Reviewed   CBC WITH AUTO DIFFERENTIAL - Abnormal; Notable for the following:        Result Value    RBC 5.51 (*)     Hemoglobin 16.5 (*)     Hematocrit 51.9 (*)     RDW 19.3 (*)     All other components within normal limits    Narrative:     Performed at:  [facility] [address]  [place]   Phone [phone]   COMPREHENSIVE METABOLIC PANEL - Abnormal; Notable for the following:     CO2 20 (*)     Glucose 107 (*)     Alkaline Phosphatase 185 (*)     All other components within normal limits    Narrative:     Performed at:  [facility] [address] [facility] [employer]   Phone [phone]   PROTIME-INR    Narrative:     Performed at:  [facility] [address] [facility],  [place]   Phone [phone]   BLOOD OCCULT STOOL DIAGNOSTIC   URINE RT REFLEX TO CULTURE   TYPE AND SCREEN    Narrative:     Performed at:  [facility] [address] [facility] [address] [facility]  [place]   Phone [phone]         IMAGING:  Imaging results from the ER have been reviewed in the computerized chart.   Ct Abdomen Pelvis W Iv Contrast Additional Contrast? None    Result Date: 11/16/2018  EXAMINATION: CT OF THE ABDOMEN AND PELVIS WITH CONTRAST 11/16/2018 5:23 pm TECHNIQUE: CT of the abdomen and pelvis was performed with the administration of intravenous contrast. Multiplanar reformatted images are provided for review. Dose modulation, iterative reconstruction, and/or weight based adjustment of the mA/kV was utilized to reduce the radiation dose to as low as reasonably achievable. COMPARISON: 09/21/2018 Written report from CT pelvis, 11/06/2018 HISTORY: ORDERING SYSTEM PROVIDED HISTORY: hx of bladder and vagina cancer, bright red blood per rectum this evening TECHNOLOGIST PROVIDED HISTORY: If patient is on cardiac monitor and/or pulse ox, they may be taken off cardiac monitor and pulse ox, left on O2 if currently on. All monitors reattached when patient returns to room. Additional Contrast?->None Ordering Physician Provided Reason for Exam: Rectal Bleeding (pt states she has been haivng rectal bleeding and low abd pain that began this evening) FINDINGS: Lower Chest: Innumerable punctate tree-in-bud nodular ground-glass opacities are found throughout both lungs, new when compared to the previous exam. Organs: There is mild intra and extrahepatic biliary dilation status post cholecystectomy. No acute or suspicious hepatic abnormalities are identified. The spleen is at the upper limits of normal in size but otherwise unremarkable. Normal adrenals. Pancreas is unremarkable. Renal cortical scarring on the right is again noted. No hydronephrosis or hydroureter is identified. Ileal conduit is noted. No ureteral obstruction is seen. GI/Bowel: There is mild diverticulosis of the large bowel, but without CT evidence of diverticulitis. Overall, no acute large bowel abnormality is detected. The appendix is normal.  Other than the ileal conduit, the small bowel is unremarkable in appearance. Evidence of previous Nissen fundoplication. The stomach and duodenum otherwise unremarkable. Pelvis: Previous resection of the urinary bladder. Previous hysterectomy.  Amorphous soft tissue is again identified along the left pelvic sidewall, where fluid was seen previously, and this probably represents organizing postsurgical seroma or hematoma. No lymphadenopathy is detected otherwise. Peritoneum/Retroperitoneum: The abdominal aorta is normal in caliber. Moderate vascular calcifications are seen. A left-sided IVC is present, crossing midline at the level of the diaphragmatic hiatus. No upper abdominal or retroperitoneal lymphadenopathy is detected. Bones/Soft Tissues: There is a permeative cortical lesions seen within the proximal right femoral diaphysis. No osteolytic or osteoblastic bone lesions are detected otherwise. No acute bony abnormalities are found. On the  image, there is evidence of a previous above-the-knee amputation on the right. No acute abnormality identified. Mild diverticulosis without CT evidence of diverticulitis. Permeative appearance of the proximal right femoral diaphysis, which given the patient's history is concerning for metastatic disease. EKG:            MEDICAL DECISION MAKING:    Patient Active Hospital Problem List:   GIB (gastrointestinal bleeding) (11/16/2018)    Assessment: New Problem to me. Pt with brbpr    Plan: admit. Iv ppi. Consult GI to see. Trend h/h   Carcinoma of urachus (HCC) ()    Assessment: Established problem. Stable. Plan: sees dr Dorinda Howe as outpt. Will notify them of admit   GERD (gastroesophageal reflux disease) (9/21/2018)    Assessment: Established problem. Stable. Plan: on iv ppi now   High cholesterol (9/21/2018)    Assessment: Established problem. Stable. Plan: Continue present orders/plan. Bony metastasis ([address] (11/16/2018)    Assessment: New Problem to me. Seen on ct abd, reviewed by self    Plan: will notify dr Dorinda Howe          Diagnoses as listed above, designated as new or established and plan outlined for each. Data Reviewed:   (1) Lab tests were reviewed or ordered. (1) Radiology tests were reviewed or ordered. (1) Medical test (Echo, EKG, PFT/stanley) were not ordered.   (1)History was not obtained from someone other than patient  (1) Old records  were reviewed - see HPI/MDM for pertinent details if review done. (2) Case wasdiscussed with another health care provider: Dr Reny Calderon  (2) Imaging was viewed by myself. Ct abd  (2) EKG  was not viewed by myself. The patient isbeing placed in inpatient status with the expectation of requiring a hospital stay spanning at least two midnights for care and treatment of the problems noted in the problem list.  This determination is also based on thepatients comorbidities and past medical history, the severity and timing of the signs and symptoms upon presentation.         Electronically signed by: Prudence Rubin 11/16/2018

## 2018-11-18 LAB — URINE CULTURE, ROUTINE: NORMAL

## 2018-11-27 ENCOUNTER — HOSPITAL ENCOUNTER (OUTPATIENT)
Dept: NUCLEAR MEDICINE | Age: 63
Discharge: HOME | End: 2018-11-27
Payer: MEDICARE

## 2018-11-27 DIAGNOSIS — C67.0 CANCER OF TRIGONE OF URINARY BLADDER (HCC): ICD-10-CM

## 2018-11-27 PROCEDURE — A9503 TC99M MEDRONATE: HCPCS | Performed by: INTERNAL MEDICINE

## 2018-11-27 PROCEDURE — 3430000000 HC RX DIAGNOSTIC RADIOPHARMACEUTICAL: Performed by: INTERNAL MEDICINE

## 2018-11-27 PROCEDURE — 78306 BONE IMAGING WHOLE BODY: CPT

## 2018-11-27 RX ORDER — TC 99M MEDRONATE 20 MG/10ML
27.01 INJECTION, POWDER, LYOPHILIZED, FOR SOLUTION INTRAVENOUS
Status: COMPLETED | OUTPATIENT
Start: 2018-11-27 | End: 2018-11-27

## 2018-11-27 RX ADMIN — Medication 27.01 MILLICURIE: at 11:08

## 2018-12-10 ENCOUNTER — APPOINTMENT (OUTPATIENT)
Dept: CT IMAGING | Age: 63
DRG: 139 | End: 2018-12-10
Payer: COMMERCIAL

## 2018-12-10 ENCOUNTER — HOSPITAL ENCOUNTER (INPATIENT)
Age: 63
LOS: 4 days | Discharge: HOME HEALTH | DRG: 139 | End: 2018-12-14
Attending: EMERGENCY MEDICINE | Admitting: INTERNAL MEDICINE
Payer: COMMERCIAL

## 2018-12-10 ENCOUNTER — APPOINTMENT (OUTPATIENT)
Dept: GENERAL RADIOLOGY | Age: 63
DRG: 139 | End: 2018-12-10
Payer: COMMERCIAL

## 2018-12-10 DIAGNOSIS — N30.00 ACUTE CYSTITIS WITHOUT HEMATURIA: ICD-10-CM

## 2018-12-10 DIAGNOSIS — J18.9 PNEUMONIA DUE TO ORGANISM: Primary | ICD-10-CM

## 2018-12-10 LAB
A/G RATIO: 1.1 (ref 1.1–2.2)
ALBUMIN SERPL-MCNC: 3.8 G/DL (ref 3.4–5)
ALP BLD-CCNC: 139 U/L (ref 40–129)
ALT SERPL-CCNC: 44 U/L (ref 10–40)
AMORPHOUS: ABNORMAL /HPF
ANION GAP SERPL CALCULATED.3IONS-SCNC: 8 MMOL/L (ref 3–16)
APTT: 26.9 SEC (ref 26–36)
AST SERPL-CCNC: 22 U/L (ref 15–37)
BACTERIA: ABNORMAL /HPF
BILIRUB SERPL-MCNC: 0.7 MG/DL (ref 0–1)
BILIRUBIN URINE: NEGATIVE
BLOOD, URINE: NEGATIVE
BUN BLDV-MCNC: 11 MG/DL (ref 7–20)
CALCIUM SERPL-MCNC: 9.8 MG/DL (ref 8.3–10.6)
CHLORIDE BLD-SCNC: 105 MMOL/L (ref 99–110)
CLARITY: ABNORMAL
CO2: 30 MMOL/L (ref 21–32)
COLOR: YELLOW
CREAT SERPL-MCNC: 0.5 MG/DL (ref 0.6–1.2)
EKG ATRIAL RATE: 69 BPM
EKG DIAGNOSIS: NORMAL
EKG P AXIS: 102 DEGREES
EKG P-R INTERVAL: 128 MS
EKG Q-T INTERVAL: 418 MS
EKG QRS DURATION: 72 MS
EKG QTC CALCULATION (BAZETT): 447 MS
EKG R AXIS: 189 DEGREES
EKG T AXIS: 149 DEGREES
EKG VENTRICULAR RATE: 69 BPM
EPITHELIAL CELLS, UA: 4 /HPF (ref 0–5)
GFR AFRICAN AMERICAN: >60
GFR NON-AFRICAN AMERICAN: >60
GLOBULIN: 3.5 G/DL
GLUCOSE BLD-MCNC: 106 MG/DL (ref 70–99)
GLUCOSE URINE: NEGATIVE MG/DL
HYALINE CASTS: 7 /LPF (ref 0–8)
INR BLD: 0.95 (ref 0.86–1.14)
KETONES, URINE: NEGATIVE MG/DL
LACTIC ACID: 1.3 MMOL/L (ref 0.4–2)
LEUKOCYTE ESTERASE, URINE: ABNORMAL
LIPASE: 9 U/L (ref 13–60)
MICROSCOPIC EXAMINATION: YES
NITRITE, URINE: POSITIVE
PH UA: 8.5
POTASSIUM REFLEX MAGNESIUM: 3.8 MMOL/L (ref 3.5–5.1)
PRO-BNP: 924 PG/ML (ref 0–124)
PROTEIN UA: NEGATIVE MG/DL
PROTHROMBIN TIME: 10.8 SEC (ref 9.8–13)
RBC UA: 2 /HPF (ref 0–4)
SODIUM BLD-SCNC: 143 MMOL/L (ref 136–145)
SPECIFIC GRAVITY UA: 1.01
TOTAL PROTEIN: 7.3 G/DL (ref 6.4–8.2)
TROPONIN: <0.01 NG/ML
URINE REFLEX TO CULTURE: YES
URINE TYPE: ABNORMAL
UROBILINOGEN, URINE: 1 E.U./DL
WBC UA: 4 /HPF (ref 0–5)

## 2018-12-10 PROCEDURE — 99284 EMERGENCY DEPT VISIT MOD MDM: CPT

## 2018-12-10 PROCEDURE — 36415 COLL VENOUS BLD VENIPUNCTURE: CPT

## 2018-12-10 PROCEDURE — 2700000000 HC OXYGEN THERAPY PER DAY

## 2018-12-10 PROCEDURE — 6360000002 HC RX W HCPCS: Performed by: INTERNAL MEDICINE

## 2018-12-10 PROCEDURE — 94760 N-INVAS EAR/PLS OXIMETRY 1: CPT

## 2018-12-10 PROCEDURE — 87077 CULTURE AEROBIC IDENTIFY: CPT

## 2018-12-10 PROCEDURE — 96375 TX/PRO/DX INJ NEW DRUG ADDON: CPT

## 2018-12-10 PROCEDURE — 93010 ELECTROCARDIOGRAM REPORT: CPT | Performed by: INTERNAL MEDICINE

## 2018-12-10 PROCEDURE — 2500000003 HC RX 250 WO HCPCS: Performed by: EMERGENCY MEDICINE

## 2018-12-10 PROCEDURE — 85610 PROTHROMBIN TIME: CPT

## 2018-12-10 PROCEDURE — S0028 INJECTION, FAMOTIDINE, 20 MG: HCPCS | Performed by: EMERGENCY MEDICINE

## 2018-12-10 PROCEDURE — 87449 NOS EACH ORGANISM AG IA: CPT

## 2018-12-10 PROCEDURE — 83690 ASSAY OF LIPASE: CPT

## 2018-12-10 PROCEDURE — 96374 THER/PROPH/DIAG INJ IV PUSH: CPT

## 2018-12-10 PROCEDURE — 93005 ELECTROCARDIOGRAM TRACING: CPT | Performed by: PHYSICIAN ASSISTANT

## 2018-12-10 PROCEDURE — 2580000003 HC RX 258: Performed by: EMERGENCY MEDICINE

## 2018-12-10 PROCEDURE — 81001 URINALYSIS AUTO W/SCOPE: CPT

## 2018-12-10 PROCEDURE — 83605 ASSAY OF LACTIC ACID: CPT

## 2018-12-10 PROCEDURE — 2580000003 HC RX 258: Performed by: INTERNAL MEDICINE

## 2018-12-10 PROCEDURE — 84484 ASSAY OF TROPONIN QUANT: CPT

## 2018-12-10 PROCEDURE — 85730 THROMBOPLASTIN TIME PARTIAL: CPT

## 2018-12-10 PROCEDURE — 87046 STOOL CULTR AEROBIC BACT EA: CPT

## 2018-12-10 PROCEDURE — 87493 C DIFF AMPLIFIED PROBE: CPT

## 2018-12-10 PROCEDURE — 71260 CT THORAX DX C+: CPT

## 2018-12-10 PROCEDURE — 71045 X-RAY EXAM CHEST 1 VIEW: CPT

## 2018-12-10 PROCEDURE — 1200000000 HC SEMI PRIVATE

## 2018-12-10 PROCEDURE — 87505 NFCT AGENT DETECTION GI: CPT

## 2018-12-10 PROCEDURE — 80053 COMPREHEN METABOLIC PANEL: CPT

## 2018-12-10 PROCEDURE — 85025 COMPLETE CBC W/AUTO DIFF WBC: CPT

## 2018-12-10 PROCEDURE — 74177 CT ABD & PELVIS W/CONTRAST: CPT

## 2018-12-10 PROCEDURE — 87040 BLOOD CULTURE FOR BACTERIA: CPT

## 2018-12-10 PROCEDURE — 6360000002 HC RX W HCPCS: Performed by: EMERGENCY MEDICINE

## 2018-12-10 PROCEDURE — 6370000000 HC RX 637 (ALT 250 FOR IP): Performed by: INTERNAL MEDICINE

## 2018-12-10 PROCEDURE — 87086 URINE CULTURE/COLONY COUNT: CPT

## 2018-12-10 PROCEDURE — 83880 ASSAY OF NATRIURETIC PEPTIDE: CPT

## 2018-12-10 PROCEDURE — 87336 ENTAMOEB HIST DISPR AG IA: CPT

## 2018-12-10 PROCEDURE — 87324 CLOSTRIDIUM AG IA: CPT

## 2018-12-10 PROCEDURE — 87186 SC STD MICRODIL/AGAR DIL: CPT

## 2018-12-10 PROCEDURE — 6360000004 HC RX CONTRAST MEDICATION: Performed by: PHYSICIAN ASSISTANT

## 2018-12-10 PROCEDURE — 87328 CRYPTOSPORIDIUM AG IA: CPT

## 2018-12-10 RX ORDER — SODIUM CHLORIDE 0.9 % (FLUSH) 0.9 %
10 SYRINGE (ML) INJECTION PRN
Status: DISCONTINUED | OUTPATIENT
Start: 2018-12-10 | End: 2018-12-14 | Stop reason: HOSPADM

## 2018-12-10 RX ORDER — CLOPIDOGREL BISULFATE 75 MG/1
75 TABLET ORAL DAILY
Status: DISCONTINUED | OUTPATIENT
Start: 2018-12-11 | End: 2018-12-11

## 2018-12-10 RX ORDER — DULOXETIN HYDROCHLORIDE 60 MG/1
60 CAPSULE, DELAYED RELEASE ORAL DAILY
Status: DISCONTINUED | OUTPATIENT
Start: 2018-12-11 | End: 2018-12-14 | Stop reason: HOSPADM

## 2018-12-10 RX ORDER — POTASSIUM CHLORIDE 20 MEQ/1
20 TABLET, EXTENDED RELEASE ORAL DAILY
Status: DISCONTINUED | OUTPATIENT
Start: 2018-12-10 | End: 2018-12-14 | Stop reason: HOSPADM

## 2018-12-10 RX ORDER — ATORVASTATIN CALCIUM 40 MG/1
40 TABLET, FILM COATED ORAL NIGHTLY
Status: DISCONTINUED | OUTPATIENT
Start: 2018-12-10 | End: 2018-12-14 | Stop reason: HOSPADM

## 2018-12-10 RX ORDER — 0.9 % SODIUM CHLORIDE 0.9 %
1000 INTRAVENOUS SOLUTION INTRAVENOUS ONCE
Status: COMPLETED | OUTPATIENT
Start: 2018-12-10 | End: 2018-12-10

## 2018-12-10 RX ORDER — PANTOPRAZOLE SODIUM 40 MG/1
40 TABLET, DELAYED RELEASE ORAL
Status: DISCONTINUED | OUTPATIENT
Start: 2018-12-11 | End: 2018-12-14 | Stop reason: HOSPADM

## 2018-12-10 RX ORDER — OXYCODONE HYDROCHLORIDE 15 MG/1
15 TABLET ORAL 2 TIMES DAILY
COMMUNITY

## 2018-12-10 RX ORDER — MORPHINE SULFATE 4 MG/ML
4 INJECTION, SOLUTION INTRAMUSCULAR; INTRAVENOUS EVERY 4 HOURS PRN
Status: DISCONTINUED | OUTPATIENT
Start: 2018-12-10 | End: 2018-12-14 | Stop reason: HOSPADM

## 2018-12-10 RX ORDER — VANCOMYCIN HYDROCHLORIDE 1 G/200ML
1000 INJECTION, SOLUTION INTRAVENOUS ONCE
Status: COMPLETED | OUTPATIENT
Start: 2018-12-10 | End: 2018-12-10

## 2018-12-10 RX ORDER — ONDANSETRON 2 MG/ML
4 INJECTION INTRAMUSCULAR; INTRAVENOUS EVERY 6 HOURS PRN
Status: DISCONTINUED | OUTPATIENT
Start: 2018-12-10 | End: 2018-12-14 | Stop reason: HOSPADM

## 2018-12-10 RX ORDER — MORPHINE SULFATE 15 MG/1
15 TABLET, FILM COATED, EXTENDED RELEASE ORAL 2 TIMES DAILY
Status: DISCONTINUED | OUTPATIENT
Start: 2018-12-10 | End: 2018-12-14 | Stop reason: HOSPADM

## 2018-12-10 RX ORDER — ONDANSETRON 2 MG/ML
4 INJECTION INTRAMUSCULAR; INTRAVENOUS ONCE
Status: COMPLETED | OUTPATIENT
Start: 2018-12-10 | End: 2018-12-10

## 2018-12-10 RX ORDER — SODIUM CHLORIDE 0.9 % (FLUSH) 0.9 %
10 SYRINGE (ML) INJECTION EVERY 12 HOURS SCHEDULED
Status: DISCONTINUED | OUTPATIENT
Start: 2018-12-10 | End: 2018-12-14 | Stop reason: HOSPADM

## 2018-12-10 RX ORDER — ROPINIROLE 0.25 MG/1
0.5 TABLET, FILM COATED ORAL 2 TIMES DAILY
Status: DISCONTINUED | OUTPATIENT
Start: 2018-12-10 | End: 2018-12-14 | Stop reason: HOSPADM

## 2018-12-10 RX ORDER — GABAPENTIN 400 MG/1
800 CAPSULE ORAL 3 TIMES DAILY
Status: DISCONTINUED | OUTPATIENT
Start: 2018-12-10 | End: 2018-12-14 | Stop reason: HOSPADM

## 2018-12-10 RX ORDER — HYDROXYZINE PAMOATE 25 MG/1
25 CAPSULE ORAL 3 TIMES DAILY PRN
Status: DISCONTINUED | OUTPATIENT
Start: 2018-12-10 | End: 2018-12-14 | Stop reason: HOSPADM

## 2018-12-10 RX ORDER — ALPRAZOLAM 0.5 MG/1
0.5 TABLET ORAL 3 TIMES DAILY
Status: DISCONTINUED | OUTPATIENT
Start: 2018-12-10 | End: 2018-12-14 | Stop reason: HOSPADM

## 2018-12-10 RX ADMIN — CEFEPIME HYDROCHLORIDE 2 G: 2 INJECTION, POWDER, FOR SOLUTION INTRAVENOUS at 16:27

## 2018-12-10 RX ADMIN — Medication 10 ML: at 21:58

## 2018-12-10 RX ADMIN — FAMOTIDINE 20 MG: 10 INJECTION, SOLUTION INTRAVENOUS at 15:49

## 2018-12-10 RX ADMIN — IOPAMIDOL 75 ML: 755 INJECTION, SOLUTION INTRAVENOUS at 15:06

## 2018-12-10 RX ADMIN — ALPRAZOLAM 0.5 MG: 0.5 TABLET ORAL at 20:29

## 2018-12-10 RX ADMIN — MORPHINE SULFATE 15 MG: 15 TABLET, FILM COATED, EXTENDED RELEASE ORAL at 20:30

## 2018-12-10 RX ADMIN — ROPINIROLE HYDROCHLORIDE 0.5 MG: 0.25 TABLET, FILM COATED ORAL at 20:30

## 2018-12-10 RX ADMIN — MORPHINE SULFATE 4 MG: 4 INJECTION INTRAVENOUS at 19:36

## 2018-12-10 RX ADMIN — SODIUM CHLORIDE 1000 ML: 9 INJECTION, SOLUTION INTRAVENOUS at 15:49

## 2018-12-10 RX ADMIN — DESMOPRESSIN ACETATE 40 MG: 0.2 TABLET ORAL at 20:29

## 2018-12-10 RX ADMIN — GABAPENTIN 800 MG: 400 CAPSULE ORAL at 20:29

## 2018-12-10 RX ADMIN — VANCOMYCIN HYDROCHLORIDE 1000 MG: 1 INJECTION, SOLUTION INTRAVENOUS at 17:42

## 2018-12-10 RX ADMIN — POTASSIUM CHLORIDE 20 MEQ: 1500 TABLET, EXTENDED RELEASE ORAL at 20:29

## 2018-12-10 RX ADMIN — ONDANSETRON HYDROCHLORIDE 4 MG: 2 INJECTION, SOLUTION INTRAMUSCULAR; INTRAVENOUS at 15:49

## 2018-12-10 ASSESSMENT — PAIN SCALES - GENERAL
PAINLEVEL_OUTOF10: 6
PAINLEVEL_OUTOF10: 3

## 2018-12-10 ASSESSMENT — PAIN DESCRIPTION - LOCATION: LOCATION: ABDOMEN

## 2018-12-10 ASSESSMENT — PAIN DESCRIPTION - PAIN TYPE: TYPE: ACUTE PAIN

## 2018-12-11 LAB
A/G RATIO: 1.1 (ref 1.1–2.2)
ALBUMIN SERPL-MCNC: 3 G/DL (ref 3.4–5)
ALP BLD-CCNC: 107 U/L (ref 40–129)
ALT SERPL-CCNC: 30 U/L (ref 10–40)
ANION GAP SERPL CALCULATED.3IONS-SCNC: 6 MMOL/L (ref 3–16)
AST SERPL-CCNC: 14 U/L (ref 15–37)
BANDED NEUTROPHILS RELATIVE PERCENT: 2 % (ref 0–7)
BASOPHILS ABSOLUTE: 0 K/UL (ref 0–0.2)
BASOPHILS RELATIVE PERCENT: 0 %
BILIRUB SERPL-MCNC: 0.5 MG/DL (ref 0–1)
BUN BLDV-MCNC: 11 MG/DL (ref 7–20)
C DIFFICILE TOXIN, EIA: NORMAL
CALCIUM SERPL-MCNC: 8.8 MG/DL (ref 8.3–10.6)
CHLORIDE BLD-SCNC: 106 MMOL/L (ref 99–110)
CO2: 27 MMOL/L (ref 21–32)
CREAT SERPL-MCNC: 0.5 MG/DL (ref 0.6–1.2)
EOSINOPHILS ABSOLUTE: 0.1 K/UL (ref 0–0.6)
EOSINOPHILS RELATIVE PERCENT: 1 %
GFR AFRICAN AMERICAN: >60
GFR NON-AFRICAN AMERICAN: >60
GLOBULIN: 2.8 G/DL
GLUCOSE BLD-MCNC: 98 MG/DL (ref 70–99)
HCT VFR BLD CALC: 44 % (ref 36–48)
HEMATOLOGY PATH CONSULT: YES
HEMOGLOBIN: 14.6 G/DL (ref 12–16)
LYMPHOCYTES ABSOLUTE: 0.2 K/UL (ref 1–5.1)
LYMPHOCYTES RELATIVE PERCENT: 3 %
MAGNESIUM: 1.7 MG/DL (ref 1.8–2.4)
MCH RBC QN AUTO: 29.3 PG (ref 26–34)
MCHC RBC AUTO-ENTMCNC: 33.2 G/DL (ref 31–36)
MCV RBC AUTO: 88.2 FL (ref 80–100)
MONOCYTES ABSOLUTE: 0.3 K/UL (ref 0–1.3)
MONOCYTES RELATIVE PERCENT: 5 %
MYELOCYTE PERCENT: 1 %
NEUTROPHILS ABSOLUTE: 6.1 K/UL (ref 1.7–7.7)
NEUTROPHILS RELATIVE PERCENT: 88 %
PDW BLD-RTO: 20.1 % (ref 12.4–15.4)
PELGER HUET CELLS: PRESENT
PLATELET # BLD: 176 K/UL (ref 135–450)
PLATELET SLIDE REVIEW: ADEQUATE
PMV BLD AUTO: 8.2 FL (ref 5–10.5)
POLYCHROMASIA: ABNORMAL
POTASSIUM REFLEX MAGNESIUM: 3.4 MMOL/L (ref 3.5–5.1)
RBC # BLD: 4.99 M/UL (ref 4–5.2)
RBC # BLD: NORMAL 10*6/UL
REPORT: NORMAL
RESPIRATORY PANEL PCR: NORMAL
SODIUM BLD-SCNC: 139 MMOL/L (ref 136–145)
TOTAL PROTEIN: 5.8 G/DL (ref 6.4–8.2)
VACUOLATED NEUTROPHILS: PRESENT
WBC # BLD: 6.7 K/UL (ref 4–11)

## 2018-12-11 PROCEDURE — 87486 CHLMYD PNEUM DNA AMP PROBE: CPT

## 2018-12-11 PROCEDURE — 94640 AIRWAY INHALATION TREATMENT: CPT

## 2018-12-11 PROCEDURE — 6360000002 HC RX W HCPCS: Performed by: INTERNAL MEDICINE

## 2018-12-11 PROCEDURE — 94760 N-INVAS EAR/PLS OXIMETRY 1: CPT

## 2018-12-11 PROCEDURE — 99223 1ST HOSP IP/OBS HIGH 75: CPT | Performed by: INTERNAL MEDICINE

## 2018-12-11 PROCEDURE — 6370000000 HC RX 637 (ALT 250 FOR IP): Performed by: INTERNAL MEDICINE

## 2018-12-11 PROCEDURE — 1200000000 HC SEMI PRIVATE

## 2018-12-11 PROCEDURE — 2700000000 HC OXYGEN THERAPY PER DAY

## 2018-12-11 PROCEDURE — 2580000003 HC RX 258: Performed by: INTERNAL MEDICINE

## 2018-12-11 PROCEDURE — 83735 ASSAY OF MAGNESIUM: CPT

## 2018-12-11 PROCEDURE — 2580000003 HC RX 258

## 2018-12-11 PROCEDURE — 80053 COMPREHEN METABOLIC PANEL: CPT

## 2018-12-11 PROCEDURE — 36415 COLL VENOUS BLD VENIPUNCTURE: CPT

## 2018-12-11 PROCEDURE — 87581 M.PNEUMON DNA AMP PROBE: CPT

## 2018-12-11 PROCEDURE — 87798 DETECT AGENT NOS DNA AMP: CPT

## 2018-12-11 PROCEDURE — 87633 RESP VIRUS 12-25 TARGETS: CPT

## 2018-12-11 RX ORDER — 0.9 % SODIUM CHLORIDE 0.9 %
10 VIAL (ML) INJECTION EVERY 12 HOURS SCHEDULED
Status: DISCONTINUED | OUTPATIENT
Start: 2018-12-11 | End: 2018-12-14 | Stop reason: HOSPADM

## 2018-12-11 RX ORDER — SODIUM CHLORIDE 9 MG/ML
INJECTION, SOLUTION INTRAVENOUS
Status: COMPLETED
Start: 2018-12-11 | End: 2018-12-11

## 2018-12-11 RX ORDER — OXYCODONE HYDROCHLORIDE 15 MG/1
15 TABLET ORAL 2 TIMES DAILY
Status: DISCONTINUED | OUTPATIENT
Start: 2018-12-11 | End: 2018-12-12

## 2018-12-11 RX ORDER — POTASSIUM CHLORIDE 20 MEQ/1
40 TABLET, EXTENDED RELEASE ORAL PRN
Status: DISCONTINUED | OUTPATIENT
Start: 2018-12-11 | End: 2018-12-14 | Stop reason: HOSPADM

## 2018-12-11 RX ORDER — DRONABINOL 2.5 MG/1
2.5 CAPSULE ORAL
COMMUNITY

## 2018-12-11 RX ORDER — IPRATROPIUM BROMIDE AND ALBUTEROL SULFATE 2.5; .5 MG/3ML; MG/3ML
1 SOLUTION RESPIRATORY (INHALATION) 4 TIMES DAILY
Status: DISCONTINUED | OUTPATIENT
Start: 2018-12-11 | End: 2018-12-13

## 2018-12-11 RX ORDER — POTASSIUM CHLORIDE 7.45 MG/ML
10 INJECTION INTRAVENOUS PRN
Status: DISCONTINUED | OUTPATIENT
Start: 2018-12-11 | End: 2018-12-14 | Stop reason: HOSPADM

## 2018-12-11 RX ORDER — PEG-3350, SODIUM SULFATE, SODIUM CHLORIDE, POTASSIUM CHLORIDE, SODIUM ASCORBATE AND ASCORBIC ACID 7.5-2.691G
100 KIT ORAL ONCE
Status: COMPLETED | OUTPATIENT
Start: 2018-12-11 | End: 2018-12-11

## 2018-12-11 RX ORDER — IPRATROPIUM BROMIDE AND ALBUTEROL SULFATE 2.5; .5 MG/3ML; MG/3ML
1 SOLUTION RESPIRATORY (INHALATION) EVERY 4 HOURS PRN
Status: DISCONTINUED | OUTPATIENT
Start: 2018-12-11 | End: 2018-12-14 | Stop reason: HOSPADM

## 2018-12-11 RX ORDER — POTASSIUM CHLORIDE 7.45 MG/ML
10 INJECTION INTRAVENOUS PRN
Status: DISCONTINUED | OUTPATIENT
Start: 2018-12-11 | End: 2018-12-11

## 2018-12-11 RX ORDER — DRONABINOL 2.5 MG/1
2.5 CAPSULE ORAL 2 TIMES DAILY
Status: DISCONTINUED | OUTPATIENT
Start: 2018-12-11 | End: 2018-12-14 | Stop reason: HOSPADM

## 2018-12-11 RX ORDER — VANCOMYCIN HYDROCHLORIDE 1 G/200ML
15 INJECTION, SOLUTION INTRAVENOUS EVERY 12 HOURS
Status: DISCONTINUED | OUTPATIENT
Start: 2018-12-11 | End: 2018-12-13

## 2018-12-11 RX ORDER — MAGNESIUM SULFATE 1 G/100ML
1 INJECTION INTRAVENOUS PRN
Status: DISCONTINUED | OUTPATIENT
Start: 2018-12-11 | End: 2018-12-14 | Stop reason: HOSPADM

## 2018-12-11 RX ORDER — 0.9 % SODIUM CHLORIDE 0.9 %
10 VIAL (ML) INJECTION PRN
Status: DISCONTINUED | OUTPATIENT
Start: 2018-12-11 | End: 2018-12-14 | Stop reason: HOSPADM

## 2018-12-11 RX ADMIN — ALPRAZOLAM 0.5 MG: 0.5 TABLET ORAL at 13:08

## 2018-12-11 RX ADMIN — POTASSIUM CHLORIDE 20 MEQ: 1500 TABLET, EXTENDED RELEASE ORAL at 08:41

## 2018-12-11 RX ADMIN — PANTOPRAZOLE SODIUM 40 MG: 40 TABLET, DELAYED RELEASE ORAL at 06:29

## 2018-12-11 RX ADMIN — MORPHINE SULFATE 4 MG: 4 INJECTION INTRAVENOUS at 02:34

## 2018-12-11 RX ADMIN — DULOXETINE HYDROCHLORIDE 60 MG: 60 CAPSULE, DELAYED RELEASE ORAL at 08:41

## 2018-12-11 RX ADMIN — ENOXAPARIN SODIUM 40 MG: 40 INJECTION SUBCUTANEOUS at 08:44

## 2018-12-11 RX ADMIN — Medication 2 PUFF: at 21:31

## 2018-12-11 RX ADMIN — OXYCODONE HYDROCHLORIDE 15 MG: 15 TABLET ORAL at 20:46

## 2018-12-11 RX ADMIN — POLYETHYLENE GLYCOL 3350, SODIUM SULFATE, SODIUM CHLORIDE, POTASSIUM CHLORIDE, ASCORBIC ACID, SODIUM ASCORBATE 100 G: KIT at 21:03

## 2018-12-11 RX ADMIN — DESMOPRESSIN ACETATE 40 MG: 0.2 TABLET ORAL at 20:46

## 2018-12-11 RX ADMIN — SODIUM CHLORIDE 250 ML: 9 INJECTION, SOLUTION INTRAVENOUS at 16:07

## 2018-12-11 RX ADMIN — ROPINIROLE HYDROCHLORIDE 0.5 MG: 0.25 TABLET, FILM COATED ORAL at 20:46

## 2018-12-11 RX ADMIN — MORPHINE SULFATE 15 MG: 15 TABLET, FILM COATED, EXTENDED RELEASE ORAL at 20:59

## 2018-12-11 RX ADMIN — ROPINIROLE HYDROCHLORIDE 0.5 MG: 0.25 TABLET, FILM COATED ORAL at 08:41

## 2018-12-11 RX ADMIN — GABAPENTIN 800 MG: 400 CAPSULE ORAL at 20:46

## 2018-12-11 RX ADMIN — POLYETHYLENE GLYCOL 3350, SODIUM SULFATE, SODIUM CHLORIDE, POTASSIUM CHLORIDE, ASCORBIC ACID, SODIUM ASCORBATE 100 G: KIT at 16:15

## 2018-12-11 RX ADMIN — Medication 10 ML: at 08:44

## 2018-12-11 RX ADMIN — Medication 10 ML: at 20:46

## 2018-12-11 RX ADMIN — GABAPENTIN 800 MG: 400 CAPSULE ORAL at 13:08

## 2018-12-11 RX ADMIN — MORPHINE SULFATE 15 MG: 15 TABLET, FILM COATED, EXTENDED RELEASE ORAL at 08:41

## 2018-12-11 RX ADMIN — Medication 10 ML: at 04:51

## 2018-12-11 RX ADMIN — CEFEPIME HYDROCHLORIDE 2 G: 2 INJECTION, POWDER, FOR SOLUTION INTRAVENOUS at 04:08

## 2018-12-11 RX ADMIN — CLOPIDOGREL 75 MG: 75 TABLET, FILM COATED ORAL at 08:41

## 2018-12-11 RX ADMIN — CEFEPIME HYDROCHLORIDE 2 G: 2 INJECTION, POWDER, FOR SOLUTION INTRAVENOUS at 17:18

## 2018-12-11 RX ADMIN — ALPRAZOLAM 0.5 MG: 0.5 TABLET ORAL at 08:41

## 2018-12-11 RX ADMIN — POTASSIUM CHLORIDE 40 MEQ: 1500 TABLET, EXTENDED RELEASE ORAL at 13:08

## 2018-12-11 RX ADMIN — OXYCODONE HYDROCHLORIDE 15 MG: 15 TABLET ORAL at 15:25

## 2018-12-11 RX ADMIN — DRONABINOL 2.5 MG: 2.5 CAPSULE ORAL at 13:08

## 2018-12-11 RX ADMIN — IPRATROPIUM BROMIDE AND ALBUTEROL SULFATE 1 AMPULE: .5; 3 SOLUTION RESPIRATORY (INHALATION) at 21:31

## 2018-12-11 RX ADMIN — VANCOMYCIN HYDROCHLORIDE 1000 MG: 1 INJECTION, SOLUTION INTRAVENOUS at 16:07

## 2018-12-11 RX ADMIN — IPRATROPIUM BROMIDE AND ALBUTEROL SULFATE 1 AMPULE: .5; 3 SOLUTION RESPIRATORY (INHALATION) at 16:21

## 2018-12-11 RX ADMIN — ALPRAZOLAM 0.5 MG: 0.5 TABLET ORAL at 20:46

## 2018-12-11 RX ADMIN — DRONABINOL 2.5 MG: 2.5 CAPSULE ORAL at 21:16

## 2018-12-11 RX ADMIN — GABAPENTIN 800 MG: 400 CAPSULE ORAL at 08:41

## 2018-12-11 RX ADMIN — IPRATROPIUM BROMIDE AND ALBUTEROL SULFATE 1 AMPULE: .5; 3 SOLUTION RESPIRATORY (INHALATION) at 13:24

## 2018-12-11 ASSESSMENT — PAIN SCALES - GENERAL
PAINLEVEL_OUTOF10: 7
PAINLEVEL_OUTOF10: 8
PAINLEVEL_OUTOF10: 7
PAINLEVEL_OUTOF10: 8

## 2018-12-11 ASSESSMENT — PAIN DESCRIPTION - LOCATION: LOCATION: ABDOMEN

## 2018-12-11 ASSESSMENT — PAIN DESCRIPTION - DESCRIPTORS: DESCRIPTORS: CONSTANT

## 2018-12-11 ASSESSMENT — PAIN DESCRIPTION - ORIENTATION: ORIENTATION: LOWER;MID

## 2018-12-11 ASSESSMENT — PAIN DESCRIPTION - PAIN TYPE: TYPE: CHRONIC PAIN

## 2018-12-11 NOTE — PLAN OF CARE
Problem: Falls - Risk of:  Goal: Will remain free from falls  Will remain free from falls   Outcome: Ongoing  Pt remains free from falls at this time. Call light within reach, will continue to monitor.   Rober Kapoor

## 2018-12-11 NOTE — CONSULTS
(MAXIPIME) 2 g IVPB minibag, 2 g, Intravenous, Q12H  ALPRAZolam (XANAX) tablet 0.5 mg, 0.5 mg, Oral, TID  atorvastatin (LIPITOR) tablet 40 mg, 40 mg, Oral, Nightly  clopidogrel (PLAVIX) tablet 75 mg, 75 mg, Oral, Daily  DULoxetine (CYMBALTA) extended release capsule 60 mg, 60 mg, Oral, Daily  gabapentin (NEURONTIN) capsule 800 mg, 800 mg, Oral, TID  morphine (MS CONTIN) extended release tablet 15 mg, 15 mg, Oral, BID  nicotine (NICODERM CQ) 7 MG/24HR 1 patch, 1 patch, Transdermal, Q24H  pantoprazole (PROTONIX) tablet 40 mg, 40 mg, Oral, QAM AC  potassium chloride (KLOR-CON M) extended release tablet 20 mEq, 20 mEq, Oral, Daily  rOPINIRole (REQUIP) tablet 0.5 mg, 0.5 mg, Oral, BID  ruxolitinib phosphate (JAKAFI) 20 MG tablet TABS (PATIENT SUPPLIED), 20 mg, Oral, BID  hydrOXYzine (VISTARIL) capsule 25 mg, 25 mg, Oral, TID PRN  sodium chloride flush 0.9 % injection 10 mL, 10 mL, Intravenous, 2 times per day  sodium chloride flush 0.9 % injection 10 mL, 10 mL, Intravenous, PRN  magnesium hydroxide (MILK OF MAGNESIA) 400 MG/5ML suspension 30 mL, 30 mL, Oral, Daily PRN  ondansetron (ZOFRAN) injection 4 mg, 4 mg, Intravenous, Q6H PRN  enoxaparin (LOVENOX) injection 40 mg, 40 mg, Subcutaneous, Daily  morphine (PF) injection 4 mg, 4 mg, Intravenous, Q4H PRN    No Known Allergies    Social History:    TOBACCO:   reports that she quit smoking about 4 months ago. Her smoking use included Cigarettes. She has a 40.00 pack-year smoking history. She has never used smokeless tobacco.  ETOH:   reports that she does not drink alcohol. Patient currently lives independently  Environmental/chemical exposure: none known     Family History:   Family History   Problem Relation Age of Onset    Cancer Mother     Heart Disease Father     Diabetes Brother      REVIEW OF SYSTEMS:    CONSTITUTIONAL:  negative for fevers, chills, diaphoresis, activity change, appetite change, fatigue, night sweats and unexpected weight change.    EYES: negative for blurred vision, eye discharge, visual disturbance and icterus  HEENT:  negative for hearing loss, tinnitus, ear drainage, sinus pressure, nasal congestion, epistaxis and snoring  RESPIRATORY:  See HPI  CARDIOVASCULAR:  negative for chest pain, palpitations, exertional chest pressure/discomfort, edema, syncope  GASTROINTESTINAL:  negative for nausea, vomiting, diarrhea, constipation, blood in stool and abdominal pain  GENITOURINARY:  negative for frequency, dysuria, urinary incontinence, decreased urine volume, and hematuria  HEMATOLOGIC/LYMPHATIC:  negative for easy bruising, bleeding and lymphadenopathy  ALLERGIC/IMMUNOLOGIC:  negative for recurrent infections, angioedema, anaphylaxis and drug reactions  ENDOCRINE:  negative for weight changes and diabetic symptoms including polyuria, polydipsia and polyphagia  MUSCULOSKELETAL:  negative for  pain, joint swelling, decreased range of motion and muscle weakness  NEUROLOGICAL:  negative for headaches, slurred speech, unilateral weakness  PSYCHIATRIC/BEHAVIORAL: negative for hallucinations, behavioral problems, confusion and agitation. Objective:   PHYSICAL EXAM:      VITALS:  /76   Pulse 75   Temp 98.3 °F (36.8 °C) (Temporal)   Resp 16   Ht 5' 4\" (1.626 m)   Wt 141 lb 3.2 oz (64 kg)   SpO2 96%   BMI 24.24 kg/m²      24HR INTAKE/OUTPUT:    Intake/Output Summary (Last 24 hours) at 12/11/18 1053  Last data filed at 12/11/18 0943   Gross per 24 hour   Intake             1400 ml   Output             1450 ml   Net              -50 ml     CONSTITUTIONAL:  awake, alert, cooperative, no apparent distress, and appears stated age  NECK:  Supple, symmetrical, trachea midline, no adenopathy, thyroid symmetric, not enlarged and no tenderness, skin normal  LUNGS:  no increased work of breathing and clear to auscultation.  No accessory muscle use  CARDIOVASCULAR: S1 and S2, no edema and no JVD  ABDOMEN:  normal bowel sounds, non-distended and no masses palpated, and no tenderness to palpation. No hepatospleenomegaly  LYMPHADENOPATHY:  no axillary or supraclavicular adenopathy. No cervical adnenopathy  PSYCHIATRIC: Oriented to person place and time. No obvious depression or anxiety. MUSCULOSKELETAL: No obvious misalignment or effusion of the joints. No clubbing, cyanosis of the digits. SKIN:  normal skin color, texture, turgor and no redness, warmth, or swelling. No palpable nodules    DATA:    Old records have been reviewed    CBC:  Recent Labs      12/10/18   1423   WBC  6.7   RBC  4.99   HGB  14.6   HCT  44.0   PLT  176   MCV  88.2   MCH  29.3   MCHC  33.2   RDW  20.1*   BANDSPCT  2      BMP:  Recent Labs      12/10/18   1423  12/11/18   0510   NA  143  139   K  3.8  3.4*   CL  105  106   CO2  30  27   BUN  11  11   CREATININE  0.5*  0.5*   CALCIUM  9.8  8.8   GLUCOSE  106*  98      ABG:  No results for input(s): PHART, CXK0QSM, PO2ART, MDS7OFA, U3WWZNHA, BEART in the last 72 hours. No results found for: BNP  Lab Results   Component Value Date    TROPONINI <0.01 12/10/2018       Cultures:     Abx:    Radiology Review:  All pertinent images / reports were reviewed as a part of this visit. Assessment:     I have reviewed laboratories, medical records and images for this visit    1. Acute Hypoxemic Respiratory Failure  · Profoundly hypoxemic on presentation. · She has corrected with 5 LPM O2 supplement  · Wean as tolerated. 2. Multifocal Pneumonia  · Could be on the basis of aspiration  · CT imaging shows bilateral ASD, some has a nodular appearance. · No fever or leukocytosis in hospital  · Cxs are NGSF  · Check Resp Film array  · On Vanco and Cefipime.     3. COPD, severity to be determined  · Extensive smoking history  · Baseline dyspnea  · Dulera  · Duoneb  · Acapella  · Mucinex

## 2018-12-11 NOTE — FLOWSHEET NOTE
12/11/18 1256   Encounter Summary   Services provided to: Patient   Referral/Consult From: Nurse   Support System Unknown   Continue Visiting (pt sleeping, left AD docs in pt's room 2/11 CL)   Complexity of Encounter Low   Routine   Type Initial   Assessment Sleeping   Advance Directives (For Healthcare)   Healthcare Directive No, patient does not have an advance directive for healthcare treatment   Advance Directives Documents given  (pt sleeping, left AD docs w/guide in pt's room 12/11 CL)   Electronically signed by Maddie Barrett on 12/11/2018 at 12:57 PM

## 2018-12-11 NOTE — PROGRESS NOTES
Telephone call back from Dr. Sharmila Grullon. Replacement protocol ordered for magnesium and potassium. Orders for Merinol 2.5mg BID.

## 2018-12-11 NOTE — PROGRESS NOTES
Diet Orders: Clear Liquid   · Oral Diet intake: %  · Oral Nutrition Supplement (ONS) Orders: None  · Anthropometric Measures:  · Ht: 5' 4\" (162.6 cm)   · Current Body Wt: 141 lb (64 kg)  · % Weight Change:10 lb (7%) loss in 3 months  · Ideal Body Wt: 120 lb (54.4 kg),   · BMI Classification: BMI 18.5 - 24.9 Normal Weight    Nutrition Interventions:   Continue current diet, Start ONS  Continued Inpatient Monitoring, Education Not Indicated    Nutrition Evaluation:   · Evaluation: Goals set   · Goals: po and supp intake 50% or greater at meals    · Monitoring: Meal Intake, Supplement Intake, Weight, Nausea or Vomiting, Diarrhea      Electronically signed by Rico Martinez on 12/11/18 at 10:04 AM    Contact Number: [phone]

## 2018-12-12 ENCOUNTER — ANESTHESIA EVENT (OUTPATIENT)
Dept: ENDOSCOPY | Age: 63
DRG: 139 | End: 2018-12-12
Payer: COMMERCIAL

## 2018-12-12 ENCOUNTER — ANESTHESIA (OUTPATIENT)
Dept: ENDOSCOPY | Age: 63
DRG: 139 | End: 2018-12-12
Payer: COMMERCIAL

## 2018-12-12 VITALS — OXYGEN SATURATION: 99 % | SYSTOLIC BLOOD PRESSURE: 119 MMHG | DIASTOLIC BLOOD PRESSURE: 60 MMHG

## 2018-12-12 LAB
CLOSTRIDIUM DIFFICILE DNA AMPLIFICATION: NORMAL
CRYPTOSPORIDIUM ANTIGEN STOOL: NORMAL
E HISTOLYTICA ANTIGEN STOOL: NORMAL
GI BACTERIAL PATHOGENS BY PCR: NORMAL
GIARDIA ANTIGEN STOOL: NORMAL
HEMATOLOGY PATH CONSULT: NORMAL
LV EF: 60 %
LVEF MODALITY: NORMAL
MAGNESIUM: 1.6 MG/DL (ref 1.8–2.4)
ORGANISM: ABNORMAL
ORGANISM: ABNORMAL
PHOSPHORUS: 3.8 MG/DL (ref 2.5–4.9)
URINE CULTURE, ROUTINE: ABNORMAL

## 2018-12-12 PROCEDURE — 94668 MNPJ CHEST WALL SBSQ: CPT

## 2018-12-12 PROCEDURE — 2580000003 HC RX 258: Performed by: INTERNAL MEDICINE

## 2018-12-12 PROCEDURE — 7100000000 HC PACU RECOVERY - FIRST 15 MIN: Performed by: INTERNAL MEDICINE

## 2018-12-12 PROCEDURE — 94667 MNPJ CHEST WALL 1ST: CPT

## 2018-12-12 PROCEDURE — 84100 ASSAY OF PHOSPHORUS: CPT

## 2018-12-12 PROCEDURE — 2700000000 HC OXYGEN THERAPY PER DAY

## 2018-12-12 PROCEDURE — 36415 COLL VENOUS BLD VENIPUNCTURE: CPT

## 2018-12-12 PROCEDURE — 3700000001 HC ADD 15 MINUTES (ANESTHESIA): Performed by: INTERNAL MEDICINE

## 2018-12-12 PROCEDURE — 94640 AIRWAY INHALATION TREATMENT: CPT

## 2018-12-12 PROCEDURE — 0DJ08ZZ INSPECTION OF UPPER INTESTINAL TRACT, VIA NATURAL OR ARTIFICIAL OPENING ENDOSCOPIC: ICD-10-PCS | Performed by: INTERNAL MEDICINE

## 2018-12-12 PROCEDURE — 2580000003 HC RX 258: Performed by: NURSE ANESTHETIST, CERTIFIED REGISTERED

## 2018-12-12 PROCEDURE — 94760 N-INVAS EAR/PLS OXIMETRY 1: CPT

## 2018-12-12 PROCEDURE — 1200000000 HC SEMI PRIVATE

## 2018-12-12 PROCEDURE — 2709999900 HC NON-CHARGEABLE SUPPLY: Performed by: INTERNAL MEDICINE

## 2018-12-12 PROCEDURE — 6360000002 HC RX W HCPCS: Performed by: INTERNAL MEDICINE

## 2018-12-12 PROCEDURE — 88305 TISSUE EXAM BY PATHOLOGIST: CPT

## 2018-12-12 PROCEDURE — 7100000001 HC PACU RECOVERY - ADDTL 15 MIN: Performed by: INTERNAL MEDICINE

## 2018-12-12 PROCEDURE — 0DBN8ZX EXCISION OF SIGMOID COLON, VIA NATURAL OR ARTIFICIAL OPENING ENDOSCOPIC, DIAGNOSTIC: ICD-10-PCS | Performed by: INTERNAL MEDICINE

## 2018-12-12 PROCEDURE — 2500000003 HC RX 250 WO HCPCS: Performed by: NURSE ANESTHETIST, CERTIFIED REGISTERED

## 2018-12-12 PROCEDURE — 6370000000 HC RX 637 (ALT 250 FOR IP): Performed by: INTERNAL MEDICINE

## 2018-12-12 PROCEDURE — 83735 ASSAY OF MAGNESIUM: CPT

## 2018-12-12 PROCEDURE — 3609012800 HC EGD DIAGNOSTIC ONLY: Performed by: INTERNAL MEDICINE

## 2018-12-12 PROCEDURE — 93306 TTE W/DOPPLER COMPLETE: CPT

## 2018-12-12 PROCEDURE — 3700000000 HC ANESTHESIA ATTENDED CARE: Performed by: INTERNAL MEDICINE

## 2018-12-12 PROCEDURE — 3609010300 HC COLONOSCOPY W/BIOPSY SINGLE/MULTIPLE: Performed by: INTERNAL MEDICINE

## 2018-12-12 PROCEDURE — 6360000002 HC RX W HCPCS: Performed by: NURSE ANESTHETIST, CERTIFIED REGISTERED

## 2018-12-12 RX ORDER — LIDOCAINE HYDROCHLORIDE 20 MG/ML
INJECTION, SOLUTION INFILTRATION; PERINEURAL PRN
Status: DISCONTINUED | OUTPATIENT
Start: 2018-12-12 | End: 2018-12-12 | Stop reason: SDUPTHER

## 2018-12-12 RX ORDER — OXYCODONE HYDROCHLORIDE 15 MG/1
15 TABLET ORAL 3 TIMES DAILY
Status: DISCONTINUED | OUTPATIENT
Start: 2018-12-12 | End: 2018-12-14 | Stop reason: HOSPADM

## 2018-12-12 RX ORDER — PROPOFOL 10 MG/ML
INJECTION, EMULSION INTRAVENOUS PRN
Status: DISCONTINUED | OUTPATIENT
Start: 2018-12-12 | End: 2018-12-12 | Stop reason: SDUPTHER

## 2018-12-12 RX ORDER — KETAMINE HYDROCHLORIDE 10 MG/ML
INJECTION, SOLUTION INTRAMUSCULAR; INTRAVENOUS PRN
Status: DISCONTINUED | OUTPATIENT
Start: 2018-12-12 | End: 2018-12-12 | Stop reason: SDUPTHER

## 2018-12-12 RX ORDER — SODIUM CHLORIDE 9 MG/ML
INJECTION, SOLUTION INTRAVENOUS CONTINUOUS PRN
Status: DISCONTINUED | OUTPATIENT
Start: 2018-12-12 | End: 2018-12-12 | Stop reason: SDUPTHER

## 2018-12-12 RX ADMIN — Medication 10 ML: at 20:37

## 2018-12-12 RX ADMIN — CEFEPIME HYDROCHLORIDE 2 G: 2 INJECTION, POWDER, FOR SOLUTION INTRAVENOUS at 17:20

## 2018-12-12 RX ADMIN — PHENYLEPHRINE HYDROCHLORIDE 100 MCG: 10 INJECTION INTRAVENOUS at 10:35

## 2018-12-12 RX ADMIN — DULOXETINE HYDROCHLORIDE 60 MG: 60 CAPSULE, DELAYED RELEASE ORAL at 11:37

## 2018-12-12 RX ADMIN — OXYCODONE HYDROCHLORIDE 15 MG: 15 TABLET ORAL at 11:37

## 2018-12-12 RX ADMIN — POTASSIUM CHLORIDE 20 MEQ: 1500 TABLET, EXTENDED RELEASE ORAL at 11:37

## 2018-12-12 RX ADMIN — PROPOFOL 40 MG: 10 INJECTION, EMULSION INTRAVENOUS at 10:10

## 2018-12-12 RX ADMIN — PROPOFOL 50 MG: 10 INJECTION, EMULSION INTRAVENOUS at 10:15

## 2018-12-12 RX ADMIN — ROPINIROLE HYDROCHLORIDE 0.5 MG: 0.25 TABLET, FILM COATED ORAL at 20:22

## 2018-12-12 RX ADMIN — Medication 10 ML: at 11:39

## 2018-12-12 RX ADMIN — ROPINIROLE HYDROCHLORIDE 0.5 MG: 0.25 TABLET, FILM COATED ORAL at 11:37

## 2018-12-12 RX ADMIN — MAGNESIUM SULFATE HEPTAHYDRATE 1 G: 1 INJECTION, SOLUTION INTRAVENOUS at 06:47

## 2018-12-12 RX ADMIN — GABAPENTIN 800 MG: 400 CAPSULE ORAL at 13:46

## 2018-12-12 RX ADMIN — CEFEPIME HYDROCHLORIDE 2 G: 2 INJECTION, POWDER, FOR SOLUTION INTRAVENOUS at 05:45

## 2018-12-12 RX ADMIN — IPRATROPIUM BROMIDE AND ALBUTEROL SULFATE 1 AMPULE: .5; 3 SOLUTION RESPIRATORY (INHALATION) at 11:51

## 2018-12-12 RX ADMIN — OXYCODONE HYDROCHLORIDE 15 MG: 15 TABLET ORAL at 20:22

## 2018-12-12 RX ADMIN — PROPOFOL 50 MG: 10 INJECTION, EMULSION INTRAVENOUS at 10:07

## 2018-12-12 RX ADMIN — MAGNESIUM SULFATE HEPTAHYDRATE 1 G: 1 INJECTION, SOLUTION INTRAVENOUS at 07:56

## 2018-12-12 RX ADMIN — DRONABINOL 2.5 MG: 2.5 CAPSULE ORAL at 13:46

## 2018-12-12 RX ADMIN — Medication 2 PUFF: at 20:01

## 2018-12-12 RX ADMIN — LIDOCAINE HYDROCHLORIDE 100 MG: 20 INJECTION, SOLUTION INFILTRATION; PERINEURAL at 10:01

## 2018-12-12 RX ADMIN — DRONABINOL 2.5 MG: 2.5 CAPSULE ORAL at 20:36

## 2018-12-12 RX ADMIN — SODIUM CHLORIDE: 9 INJECTION, SOLUTION INTRAVENOUS at 09:53

## 2018-12-12 RX ADMIN — MORPHINE SULFATE 15 MG: 15 TABLET, FILM COATED, EXTENDED RELEASE ORAL at 11:36

## 2018-12-12 RX ADMIN — KETAMINE HYDROCHLORIDE 5 MG: 10 INJECTION, SOLUTION INTRAMUSCULAR; INTRAVENOUS at 10:08

## 2018-12-12 RX ADMIN — KETAMINE HYDROCHLORIDE 5 MG: 10 INJECTION, SOLUTION INTRAMUSCULAR; INTRAVENOUS at 10:01

## 2018-12-12 RX ADMIN — PROPOFOL 50 MG: 10 INJECTION, EMULSION INTRAVENOUS at 10:20

## 2018-12-12 RX ADMIN — OXYCODONE HYDROCHLORIDE 15 MG: 15 TABLET ORAL at 16:51

## 2018-12-12 RX ADMIN — MORPHINE SULFATE 15 MG: 15 TABLET, FILM COATED, EXTENDED RELEASE ORAL at 20:22

## 2018-12-12 RX ADMIN — PHENYLEPHRINE HYDROCHLORIDE 100 MCG: 10 INJECTION INTRAVENOUS at 10:23

## 2018-12-12 RX ADMIN — IPRATROPIUM BROMIDE AND ALBUTEROL SULFATE 1 AMPULE: .5; 3 SOLUTION RESPIRATORY (INHALATION) at 20:01

## 2018-12-12 RX ADMIN — VANCOMYCIN HYDROCHLORIDE 1000 MG: 1 INJECTION, SOLUTION INTRAVENOUS at 15:14

## 2018-12-12 RX ADMIN — VANCOMYCIN HYDROCHLORIDE 1000 MG: 1 INJECTION, SOLUTION INTRAVENOUS at 04:39

## 2018-12-12 RX ADMIN — PROPOFOL 50 MG: 10 INJECTION, EMULSION INTRAVENOUS at 10:12

## 2018-12-12 RX ADMIN — ALPRAZOLAM 0.5 MG: 0.5 TABLET ORAL at 20:21

## 2018-12-12 RX ADMIN — DESMOPRESSIN ACETATE 40 MG: 0.2 TABLET ORAL at 20:22

## 2018-12-12 RX ADMIN — PROPOFOL 50 MG: 10 INJECTION, EMULSION INTRAVENOUS at 10:17

## 2018-12-12 RX ADMIN — PROPOFOL 60 MG: 10 INJECTION, EMULSION INTRAVENOUS at 10:01

## 2018-12-12 RX ADMIN — Medication 2 PUFF: at 08:00

## 2018-12-12 RX ADMIN — IPRATROPIUM BROMIDE AND ALBUTEROL SULFATE 1 AMPULE: .5; 3 SOLUTION RESPIRATORY (INHALATION) at 16:32

## 2018-12-12 RX ADMIN — ALPRAZOLAM 0.5 MG: 0.5 TABLET ORAL at 11:37

## 2018-12-12 RX ADMIN — IPRATROPIUM BROMIDE AND ALBUTEROL SULFATE 1 AMPULE: .5; 3 SOLUTION RESPIRATORY (INHALATION) at 08:00

## 2018-12-12 RX ADMIN — PROPOFOL 50 MG: 10 INJECTION, EMULSION INTRAVENOUS at 10:05

## 2018-12-12 RX ADMIN — GABAPENTIN 800 MG: 400 CAPSULE ORAL at 20:22

## 2018-12-12 ASSESSMENT — PULMONARY FUNCTION TESTS
PIF_VALUE: 0

## 2018-12-12 ASSESSMENT — PAIN DESCRIPTION - PAIN TYPE
TYPE: CHRONIC PAIN

## 2018-12-12 ASSESSMENT — PAIN - FUNCTIONAL ASSESSMENT: PAIN_FUNCTIONAL_ASSESSMENT: 0-10

## 2018-12-12 ASSESSMENT — PAIN DESCRIPTION - DESCRIPTORS: DESCRIPTORS: CONSTANT

## 2018-12-12 ASSESSMENT — PAIN DESCRIPTION - LOCATION
LOCATION: ABDOMEN

## 2018-12-12 ASSESSMENT — PAIN SCALES - GENERAL
PAINLEVEL_OUTOF10: 6
PAINLEVEL_OUTOF10: 8
PAINLEVEL_OUTOF10: 7

## 2018-12-12 ASSESSMENT — PAIN DESCRIPTION - ORIENTATION: ORIENTATION: MID;LOWER

## 2018-12-12 NOTE — PROGRESS NOTES
Mg 1.6 on morning labs. Mag replacements started per protocol. Received call from endo, scheduled ~0945. Pt aware.

## 2018-12-12 NOTE — PLAN OF CARE
Problem: Falls - Risk of:  Goal: Will remain free from falls  Will remain free from falls   Outcome: Ongoing  Fall precautions in place, hourly rounding, call light and belongings in reach, bed in lowest position, wheels locked in place, side rails up x 2, walkways free of clutter      Problem: Risk for Impaired Skin Integrity  Goal: Tissue integrity - skin and mucous membranes  Structural intactness and normal physiological function of skin and  mucous membranes. Outcome: Ongoing  Skin remains dry and intact, no signs of breakdown noted. Patient encouraged to reposition every 2 hours and is assisted to do so when unable to reposition independently. Problem: Pain:  Goal: Pain level will decrease  Pain level will decrease   Outcome: Ongoing  Pt has PRN pain medication available upon request. Pt aware to let nursing know when pain medication is needed.       Problem: Nutrition  Goal: Optimal nutrition therapy  Outcome: Ongoing

## 2018-12-12 NOTE — OP NOTE
[address] [place] [address]  Endoscopy Note    Patient: Spencer Rose  : 1955  CSN: [id number]    Procedure: Colonoscopy with biopsy    Date:  2018    Surgeon:  Hilda Carmona MD    Referring Physician:   Orestes Ferreira    Preoperative Diagnosis:   Rectal bleeding    Postoperative Diagnosis:   Tortuous sigmoid colon    Anesthesia:  TIVA/MAC per anesthesia     EBL: <50 mL    Indications: This is a 61y.o. year old female who presents today with rectal bleeding and diarrhea. Procedure: An informed consent was obtained from the patient after explanation of indications, benefits, possible risks and complications of the procedure. The patient was then taken to the endoscopy suite, placed in the left lateral decubitus position, and the above IV anesthesia was administered. A digital rectal examination was performed and revealed negative without mass, lesions or tenderness. The Olympus CFQ-180-AL video colonoscope was placed in the patient's rectum under digital direction and advanced to the cecum. The cecum was identified by characteristic anatomy and ballottment. The prep was excellent. The ileocecal valve was identified and intubated     Visualization of the terminal ileum demonstrated normal mucosa. The scope was then withdrawn back through the cecum, ascending, transverse, descending and sigmoid colons. Carefull circumferential examination of the mucosa in these areas demonstrated normal colonic mucosa throughout. There was tortuosity in the sigmoid colon but no obvious mucosal lesions. Random colon biopsies were obtained. The scope was then withdrawn into the rectum and retroflexed. The retroflexed view of the anal verge and rectum demonstrates small hemorrhoids. The scope was straightened, the colon was decompressed and the scope was withdrawn from the patient. The patient tolerated the procedure well and was taken to the PACU in good condition.     Impression:  1) Normal

## 2018-12-12 NOTE — H&P
History & Physical    Patient Information:  Toñito Patterson is a 61 y.o.  female    Chief Complaints:  Nausea, diarrhea and weakness    [place]:  Patient presented to the ER with complaints of nausea and dry heaves. She has not been able to keep liquids down. She reports fever at home. She also has abdominal discomfort. Bowels have been loose. No chest pain. In the ER she became hypoxic. No leg swelling. No leg ulcers. No blood in stool. Past Medical History:   has a past medical history of Bladder cancer ([facility] [address] Blood clot in vein; Depression; Hx of blood clots; and Polycythemia. Past Surgical History:   has a past surgical history that includes Carpal tunnel release; Pilonidal cyst drainage; Foot surgery; Hysterectomy; Cholecystectomy; above knee amputation (Right); hiatal hernia repair (08/03/2018); and Bladder removal (08/27/2018). Medications:  Prior to Admission medications    Medication Sig Start Date End Date Taking? Authorizing Provider   dronabinol (MARINOL) 2.5 MG capsule Take 2.5 mg by mouth 2 times daily (before meals). .   Yes Historical Provider, MD   oxyCODONE (OXY-IR) 15 MG immediate release tablet Take 15 mg by mouth 2 times daily. .   Yes Historical Provider, MD   hydrOXYzine (ATARAX) 25 MG tablet Take 25 mg by mouth 3 times daily as needed for Itching   Yes Historical Provider, MD   pantoprazole (PROTONIX) 40 MG tablet Take 40 mg by mouth daily   Yes Historical Provider, MD   rOPINIRole (REQUIP) 0.5 MG tablet Take 0.5 mg by mouth 2 times daily   Yes Historical Provider, MD   ruxolitinib phosphate (JAKAFI) 20 MG tablet Take 20 mg by mouth 2 times daily   Yes Historical Provider, MD   diphenoxylate-atropine (LOMOTIL) 2.5-0.025 MG per tablet Take 1 tablet by mouth 3 times daily as needed for Diarrhea. .   Yes Historical Provider, MD   morphine (MS CONTIN) 15 MG extended release tablet Take 15 mg by mouth 2 times daily. Rosy Campo Historical Provider, MD   ALPRAZolam Giovanni Hdez) 0.5 MG tablet Take 0.5 mg by mouth 3 times daily. .   Yes Historical Provider, MD   gabapentin (NEURONTIN) 800 MG tablet Take 800 mg by mouth 3 times daily. .   Yes Historical Provider, MD   clopidogrel (PLAVIX) 75 MG tablet Take 75 mg by mouth daily   Yes Historical Provider, MD   DULoxetine (CYMBALTA) 60 MG extended release capsule Take 60 mg by mouth daily   Yes Historical Provider, MD   atorvastatin (LIPITOR) 40 MG tablet Take 1 tablet by mouth daily 12/8/15  Yes Jennifer Flores MD   nicotine (NICODERM CQ) 7 MG/24HR Place 1 patch onto the skin every 24 hours  11/11/15  Yes Historical Provider, MD   sertraline (ZOLOFT) 50 MG tablet Take 50 mg by mouth daily    Historical Provider, MD   potassium chloride (KLOR-CON M) 20 MEQ extended release tablet Take 20 mEq by mouth daily    Historical Provider, MD       Allergies:  Patient has no known allergies. Family History:  family history includes Cancer in her mother; Diabetes in her brother; Heart Disease in her father. Social History:   reports that she quit smoking about 4 months ago. Her smoking use included Cigarettes. She has a 40.00 pack-year smoking history. She has never used smokeless tobacco. She reports that she does not drink alcohol or use drugs. ROS:  Complete System review was done and pertinent positives are mentioned in the HPI. Otherwise all the other system review are negative. Physical Exam:  /74   Pulse 75   Temp 97.9 °F (36.6 °C) (Oral)   Resp 18   Ht 5' 4\" (1.626 m)   Wt 141 lb 3.2 oz (64 kg)   SpO2 97%   BMI 24.24 kg/m²   General appearance: Conscious awake and alert. Appears older than stated age; pleasant and cooperative. Head: Normocephalic, without obvious abnormality, atraumatic  Eyes: Mild pallor, no icterus or conjunctival hemorrhage. Pupils are equal, round and react to light, Extraocular muscle movements are intact. Ears: External ears without deformity. Nose: Nares normal. Septum midline.  Mucosa normal. No drainage or sinus

## 2018-12-13 ENCOUNTER — APPOINTMENT (OUTPATIENT)
Dept: GENERAL RADIOLOGY | Age: 63
DRG: 139 | End: 2018-12-13
Payer: COMMERCIAL

## 2018-12-13 LAB
ANION GAP SERPL CALCULATED.3IONS-SCNC: 8 MMOL/L (ref 3–16)
BUN BLDV-MCNC: 7 MG/DL (ref 7–20)
CALCIUM SERPL-MCNC: 8.7 MG/DL (ref 8.3–10.6)
CHLORIDE BLD-SCNC: 111 MMOL/L (ref 99–110)
CO2: 23 MMOL/L (ref 21–32)
CREAT SERPL-MCNC: 0.5 MG/DL (ref 0.6–1.2)
GFR AFRICAN AMERICAN: >60
GFR NON-AFRICAN AMERICAN: >60
GLUCOSE BLD-MCNC: 103 MG/DL (ref 70–99)
GLUCOSE BLD-MCNC: 93 MG/DL (ref 70–99)
MAGNESIUM: 1.9 MG/DL (ref 1.8–2.4)
PERFORMED ON: ABNORMAL
PHOSPHORUS: 4.7 MG/DL (ref 2.5–4.9)
POTASSIUM SERPL-SCNC: 3.7 MMOL/L (ref 3.5–5.1)
SODIUM BLD-SCNC: 142 MMOL/L (ref 136–145)
VANCOMYCIN TROUGH: 12.3 UG/ML (ref 10–20)

## 2018-12-13 PROCEDURE — 6370000000 HC RX 637 (ALT 250 FOR IP): Performed by: INTERNAL MEDICINE

## 2018-12-13 PROCEDURE — 94640 AIRWAY INHALATION TREATMENT: CPT

## 2018-12-13 PROCEDURE — 83735 ASSAY OF MAGNESIUM: CPT

## 2018-12-13 PROCEDURE — 84100 ASSAY OF PHOSPHORUS: CPT

## 2018-12-13 PROCEDURE — 36415 COLL VENOUS BLD VENIPUNCTURE: CPT

## 2018-12-13 PROCEDURE — 2580000003 HC RX 258: Performed by: INTERNAL MEDICINE

## 2018-12-13 PROCEDURE — 6360000002 HC RX W HCPCS: Performed by: INTERNAL MEDICINE

## 2018-12-13 PROCEDURE — 80048 BASIC METABOLIC PNL TOTAL CA: CPT

## 2018-12-13 PROCEDURE — 1200000000 HC SEMI PRIVATE

## 2018-12-13 PROCEDURE — 80202 ASSAY OF VANCOMYCIN: CPT

## 2018-12-13 PROCEDURE — 94668 MNPJ CHEST WALL SBSQ: CPT

## 2018-12-13 PROCEDURE — 94760 N-INVAS EAR/PLS OXIMETRY 1: CPT

## 2018-12-13 PROCEDURE — 99232 SBSQ HOSP IP/OBS MODERATE 35: CPT | Performed by: INTERNAL MEDICINE

## 2018-12-13 PROCEDURE — 71046 X-RAY EXAM CHEST 2 VIEWS: CPT

## 2018-12-13 RX ORDER — IPRATROPIUM BROMIDE AND ALBUTEROL SULFATE 2.5; .5 MG/3ML; MG/3ML
1 SOLUTION RESPIRATORY (INHALATION) 2 TIMES DAILY
Status: DISCONTINUED | OUTPATIENT
Start: 2018-12-13 | End: 2018-12-14 | Stop reason: HOSPADM

## 2018-12-13 RX ADMIN — ENOXAPARIN SODIUM 40 MG: 40 INJECTION SUBCUTANEOUS at 10:01

## 2018-12-13 RX ADMIN — IPRATROPIUM BROMIDE AND ALBUTEROL SULFATE 1 AMPULE: .5; 3 SOLUTION RESPIRATORY (INHALATION) at 21:18

## 2018-12-13 RX ADMIN — MORPHINE SULFATE 15 MG: 15 TABLET, FILM COATED, EXTENDED RELEASE ORAL at 21:15

## 2018-12-13 RX ADMIN — GABAPENTIN 800 MG: 400 CAPSULE ORAL at 13:44

## 2018-12-13 RX ADMIN — OXYCODONE HYDROCHLORIDE 15 MG: 15 TABLET ORAL at 13:44

## 2018-12-13 RX ADMIN — GABAPENTIN 800 MG: 400 CAPSULE ORAL at 09:52

## 2018-12-13 RX ADMIN — MORPHINE SULFATE 4 MG: 4 INJECTION INTRAVENOUS at 07:39

## 2018-12-13 RX ADMIN — DRONABINOL 2.5 MG: 2.5 CAPSULE ORAL at 21:15

## 2018-12-13 RX ADMIN — Medication 2 PUFF: at 07:44

## 2018-12-13 RX ADMIN — ALPRAZOLAM 0.5 MG: 0.5 TABLET ORAL at 09:51

## 2018-12-13 RX ADMIN — DESMOPRESSIN ACETATE 40 MG: 0.2 TABLET ORAL at 21:15

## 2018-12-13 RX ADMIN — Medication 10 ML: at 21:16

## 2018-12-13 RX ADMIN — DULOXETINE HYDROCHLORIDE 60 MG: 60 CAPSULE, DELAYED RELEASE ORAL at 09:51

## 2018-12-13 RX ADMIN — CEFEPIME HYDROCHLORIDE 2 G: 2 INJECTION, POWDER, FOR SOLUTION INTRAVENOUS at 04:37

## 2018-12-13 RX ADMIN — Medication 2 PUFF: at 21:18

## 2018-12-13 RX ADMIN — VANCOMYCIN HYDROCHLORIDE 1250 MG: 10 INJECTION, POWDER, LYOPHILIZED, FOR SOLUTION INTRAVENOUS at 18:53

## 2018-12-13 RX ADMIN — OXYCODONE HYDROCHLORIDE 15 MG: 15 TABLET ORAL at 21:15

## 2018-12-13 RX ADMIN — CEFEPIME HYDROCHLORIDE 2 G: 2 INJECTION, POWDER, FOR SOLUTION INTRAVENOUS at 17:07

## 2018-12-13 RX ADMIN — DRONABINOL 2.5 MG: 2.5 CAPSULE ORAL at 09:52

## 2018-12-13 RX ADMIN — POTASSIUM CHLORIDE 20 MEQ: 1500 TABLET, EXTENDED RELEASE ORAL at 09:51

## 2018-12-13 RX ADMIN — ROPINIROLE HYDROCHLORIDE 0.5 MG: 0.25 TABLET, FILM COATED ORAL at 09:51

## 2018-12-13 RX ADMIN — ROPINIROLE HYDROCHLORIDE 0.5 MG: 0.25 TABLET, FILM COATED ORAL at 21:31

## 2018-12-13 RX ADMIN — PANTOPRAZOLE SODIUM 40 MG: 40 TABLET, DELAYED RELEASE ORAL at 07:34

## 2018-12-13 RX ADMIN — IPRATROPIUM BROMIDE AND ALBUTEROL SULFATE 1 AMPULE: .5; 3 SOLUTION RESPIRATORY (INHALATION) at 12:08

## 2018-12-13 RX ADMIN — ALPRAZOLAM 0.5 MG: 0.5 TABLET ORAL at 21:15

## 2018-12-13 RX ADMIN — GABAPENTIN 800 MG: 400 CAPSULE ORAL at 21:14

## 2018-12-13 RX ADMIN — MORPHINE SULFATE 4 MG: 4 INJECTION INTRAVENOUS at 01:12

## 2018-12-13 RX ADMIN — Medication 10 ML: at 10:02

## 2018-12-13 RX ADMIN — OXYCODONE HYDROCHLORIDE 15 MG: 15 TABLET ORAL at 09:51

## 2018-12-13 RX ADMIN — Medication 10 ML: at 10:01

## 2018-12-13 RX ADMIN — VANCOMYCIN HYDROCHLORIDE 1000 MG: 1 INJECTION, SOLUTION INTRAVENOUS at 03:22

## 2018-12-13 RX ADMIN — MORPHINE SULFATE 15 MG: 15 TABLET, FILM COATED, EXTENDED RELEASE ORAL at 09:51

## 2018-12-13 RX ADMIN — MORPHINE SULFATE 4 MG: 4 INJECTION INTRAVENOUS at 17:05

## 2018-12-13 RX ADMIN — ALPRAZOLAM 0.5 MG: 0.5 TABLET ORAL at 13:44

## 2018-12-13 RX ADMIN — IPRATROPIUM BROMIDE AND ALBUTEROL SULFATE 1 AMPULE: .5; 3 SOLUTION RESPIRATORY (INHALATION) at 07:44

## 2018-12-13 ASSESSMENT — PAIN SCALES - GENERAL
PAINLEVEL_OUTOF10: 7
PAINLEVEL_OUTOF10: 6
PAINLEVEL_OUTOF10: 7
PAINLEVEL_OUTOF10: 8
PAINLEVEL_OUTOF10: 0
PAINLEVEL_OUTOF10: 3

## 2018-12-13 ASSESSMENT — PAIN DESCRIPTION - ORIENTATION
ORIENTATION: MID;LOWER
ORIENTATION: MID;LOWER

## 2018-12-13 ASSESSMENT — PAIN DESCRIPTION - LOCATION
LOCATION: ABDOMEN
LOCATION: ABDOMEN

## 2018-12-13 ASSESSMENT — PAIN DESCRIPTION - PAIN TYPE
TYPE: CHRONIC PAIN
TYPE: CHRONIC PAIN

## 2018-12-13 NOTE — PROGRESS NOTES
12/13/2018    CREATININE 0.5 12/13/2018    CALCIUM 8.7 12/13/2018     ABG: No results found for: EFG0SEC, BEART, V3WZUQLC, PHART, THGBART, OFN2SSD, PO2ART, IIX2EXS    Radiology: All pertinent images / reports were reviewed as a part of this visit. Narrative   EXAMINATION:   CT OF THE ABDOMEN AND PELVIS WITH CONTRAST; CTA OF THE CHEST 12/10/2018 3:15   pm; 12/10/2018 3:14 pm       TECHNIQUE:   CT of the abdomen and pelvis was performed with the administration of   intravenous contrast. Multiplanar reformatted images are provided for review. Dose modulation, iterative reconstruction, and/or weight based adjustment of   the mA/kV was utilized to reduce the radiation dose to as low as reasonably   achievable.; CTA of the chest was performed after the administration of   intravenous contrast.  Multiplanar reformatted images are provided for   review.  MIP images are provided for review.  Dose modulation, iterative   reconstruction, and/or weight based adjustment of the mA/kV was utilized to   reduce the radiation dose to as low as reasonably achievable.       COMPARISON:   Comparison CT abdomen and pelvis from November 16, 2018       HISTORY:   ORDERING SYSTEM PROVIDED HISTORY: pain with n/v/d h/o bladder cancer   TECHNOLOGIST PROVIDED HISTORY:   Additional Contrast?->None   Ordering Physician Provided Reason for Exam: abd pain, hypotension   Acuity: Unknown   Type of Exam: Unknown; ORDERING SYSTEM PROVIDED HISTORY: sob and fever wtih CA   TECHNOLOGIST PROVIDED HISTORY:   Ordering Physician Provided Reason for Exam: hypotension   Acuity: Unknown   Type of Exam: Unknown       FINDINGS:       Chest:       Mediastinum: Multiple vascular calcifications are noted.  Dilated azygos vein   is noted.  Multiple small lymph nodes are noted.  Detail is limited due to   artifact.  There is no aneurysm or dissection of the aorta.  Hiatal hernia is   noted.  Minimal pleural fluid is noted bilaterally.       Pulmonary arteries: No Bladder is not seen.  Distal colon is redundant and incompletely   distended.  A few diverticula are noted.  There is questionable wall   thickening.  Small bowel loops are normal in caliber in the pelvis.  Postop   changes in the right hemipelvis are noted.  Multifocal lobular heterogeneous   density in this region is noted. Carolynne [place] are likely closely opposed bowel   loops.  Multiple pelvic vascular calcifications are noted.  No pathologically   enlarged lymph nodes are noted.  Small scattered nodes are present.       Peritoneum/Retroperitoneum: Left-sided inferior vena cava is noted.  This is   a normal variant.  This crosses midline in the upper abdomen at the level of   the celiac axis.  Vascular calcifications are noted. Alexander Mcihel is no aneurysm. No pathologic adenopathy is noted       Bones/Soft Tissues: Irregular density of the right proximal femur is again   noted.  Sclerotic density in the leftward aspect of the pubic symphysis is   again noted.  Degenerative changes in the spine and pelvis are again noted.           Impression   Multifocal airspace disease in both lungs, right greater than left.    Pneumonia is favored.  Multiple associated nodules are noted.  This may   represent sequela of inflammatory/infectious nodules, however given the   history, metastatic disease would be difficult to exclude.  Continued   follow-up recommended.       No findings diagnostic of pulmonary embolus or aortic aneurysm/dissection.       Hiatal hernia       Indeterminate right renal apex low-density lesion.  This is stable       Stable multiple low-density renal lesions are likely tiny cysts.       Multifocal postop change       No pathologic adenopathy or new mass.       Question wall thickening in the distal most aspect of the colon.  Correlate   clinically as relates to potential colitis.       Stable permeative appearing lesion right proximal femur         Problem List:   Acute hypoxic respiratory

## 2018-12-13 NOTE — PROGRESS NOTES
RESPIRATORY THERAPY ASSESSMENT    Name:  Aquilino Jorge Record Number:  [id number]  Age: 61 y.o. Gender: female  : 1955  Today's Date:  2018  Room:  [address]-    Assessment   Patient Admission Diagnosis      Allergies  No Known Allergies    Minimum Predicted Vital Capacity:     TITI          Actual Vital Capacity:      TITI          Pulmonary History: No history   Home Oxygen Therapy:  room air  Home Respiratory Therapy: None  Current Respiratory Therapy:  HHN Duoneb q4wa, HHNJ Duoneb q4prn  Treatment Type: HHN  Medications: Albuterol/Ipratropium    Respiratory Severity Index(RSI)   Patients with orders for inhalation medications, oxygen, or any therapeutic treatment modality will be placed on Respiratory Protocol. They will be assessed with the first treatment and at least every 72 hours thereafter. The following severity scale will be used to determine frequency of treatment intervention.     Smoking History: Pulmonary Disease or Smoking History, Greater than 15 pack year = 2    Social History  Social History   Substance Use Topics    Smoking status: Former Smoker     Packs/day: 1.00     Years: 40.00     Types: Cigarettes     Quit date: 2018    Smokeless tobacco: Never Used    Alcohol use No       Recent Surgical History: None = 0  Past Surgical History  Past Surgical History:   Procedure Laterality Date    ABOVE KNEE AMPUTATION Right     BLADDER REMOVAL  2018    Robotic assisted laparoscopic radical cystectomy with ileal conduit, bilateral pelvic lymphadenectomy, umbilectomy, and layered closure of the abdominal wall    CARPAL TUNNEL RELEASE      BILAT    CHOLECYSTECTOMY      COLONOSCOPY N/A 2018    COLONOSCOPY WITH BIOPSY performed by Nkechi Keith MD at [facility]  2018    LAPAROSCOPIC HERNIA HIATAL WITH NISSEN FUNDOPLICATION    HYSTERECTOMY      PARTIAL    PILONIDAL

## 2018-12-13 NOTE — PROGRESS NOTES
176     BMP:   Recent Labs      12/10/18   1423  12/11/18   0510  12/12/18   0511  12/13/18   0229   NA  143  139   --   142   K  3.8  3.4*   --   3.7   CL  105  106   --   111*   CO2  30  27   --   23   PHOS   --    --   3.8  4.7   BUN  11  11   --   7   CREATININE  0.5*  0.5*   --   0.5*     LIVER PROFILE:   Recent Labs      12/10/18   1423  12/11/18   0510   AST  22  14*   ALT  44*  30   LIPASE  9.0*   --    BILITOT  0.7  0.5   ALKPHOS  139*  107     PT/INR:   Recent Labs      12/10/18   1423   PROTIME  10.8   INR  0.95     APTT:   Recent Labs      12/10/18   1423   APTT  26.9       Assessment:  1. Acute Hypoxic Respiratory failure  2. B/L Pneumonia  3. COPD Exacerbation  4. Malignant neoplasm of Bladder  5. Diarrhea  6. Hyperlipidemia  7. Major Depression    Plan:  1. Continue current regimen  2.  Expect discharge soon    Filemon Soares MD, 12/13/2018 2:13 PM

## 2018-12-14 ENCOUNTER — TELEPHONE (OUTPATIENT)
Dept: PULMONOLOGY | Age: 63
End: 2018-12-14

## 2018-12-14 VITALS
SYSTOLIC BLOOD PRESSURE: 110 MMHG | DIASTOLIC BLOOD PRESSURE: 73 MMHG | OXYGEN SATURATION: 95 % | TEMPERATURE: 96.9 F | WEIGHT: 148.8 LBS | RESPIRATION RATE: 16 BRPM | HEART RATE: 83 BPM | HEIGHT: 64 IN | BODY MASS INDEX: 25.4 KG/M2

## 2018-12-14 LAB
MAGNESIUM: 1.6 MG/DL (ref 1.8–2.4)
PHOSPHORUS: 3.3 MG/DL (ref 2.5–4.9)

## 2018-12-14 PROCEDURE — 83735 ASSAY OF MAGNESIUM: CPT

## 2018-12-14 PROCEDURE — 2580000003 HC RX 258: Performed by: INTERNAL MEDICINE

## 2018-12-14 PROCEDURE — 94668 MNPJ CHEST WALL SBSQ: CPT

## 2018-12-14 PROCEDURE — 94640 AIRWAY INHALATION TREATMENT: CPT

## 2018-12-14 PROCEDURE — 6370000000 HC RX 637 (ALT 250 FOR IP): Performed by: INTERNAL MEDICINE

## 2018-12-14 PROCEDURE — 6360000002 HC RX W HCPCS: Performed by: INTERNAL MEDICINE

## 2018-12-14 PROCEDURE — 36415 COLL VENOUS BLD VENIPUNCTURE: CPT

## 2018-12-14 PROCEDURE — 84100 ASSAY OF PHOSPHORUS: CPT

## 2018-12-14 PROCEDURE — 94760 N-INVAS EAR/PLS OXIMETRY 1: CPT

## 2018-12-14 RX ORDER — MAGNESIUM SULFATE IN WATER 40 MG/ML
2 INJECTION, SOLUTION INTRAVENOUS ONCE
Status: COMPLETED | OUTPATIENT
Start: 2018-12-14 | End: 2018-12-14

## 2018-12-14 RX ORDER — AMOXICILLIN AND CLAVULANATE POTASSIUM 875; 125 MG/1; MG/1
1 TABLET, FILM COATED ORAL 2 TIMES DAILY
Qty: 14 TABLET | Refills: 0 | Status: SHIPPED | OUTPATIENT
Start: 2018-12-14 | End: 2018-12-21

## 2018-12-14 RX ORDER — MAGNESIUM OXIDE 400 MG/1
400 TABLET ORAL DAILY
Qty: 30 TABLET | Refills: 1 | Status: SHIPPED | OUTPATIENT
Start: 2018-12-14

## 2018-12-14 RX ADMIN — GABAPENTIN 800 MG: 400 CAPSULE ORAL at 09:44

## 2018-12-14 RX ADMIN — ROPINIROLE HYDROCHLORIDE 0.5 MG: 0.25 TABLET, FILM COATED ORAL at 09:44

## 2018-12-14 RX ADMIN — ALPRAZOLAM 0.5 MG: 0.5 TABLET ORAL at 13:45

## 2018-12-14 RX ADMIN — MAGNESIUM SULFATE HEPTAHYDRATE 2 G: 40 INJECTION, SOLUTION INTRAVENOUS at 09:51

## 2018-12-14 RX ADMIN — DULOXETINE HYDROCHLORIDE 60 MG: 60 CAPSULE, DELAYED RELEASE ORAL at 09:44

## 2018-12-14 RX ADMIN — POTASSIUM CHLORIDE 20 MEQ: 1500 TABLET, EXTENDED RELEASE ORAL at 09:44

## 2018-12-14 RX ADMIN — ENOXAPARIN SODIUM 40 MG: 40 INJECTION SUBCUTANEOUS at 09:45

## 2018-12-14 RX ADMIN — MORPHINE SULFATE 4 MG: 4 INJECTION INTRAVENOUS at 04:14

## 2018-12-14 RX ADMIN — OXYCODONE HYDROCHLORIDE 15 MG: 15 TABLET ORAL at 13:45

## 2018-12-14 RX ADMIN — DRONABINOL 2.5 MG: 2.5 CAPSULE ORAL at 09:44

## 2018-12-14 RX ADMIN — ONDANSETRON HYDROCHLORIDE 4 MG: 2 INJECTION, SOLUTION INTRAMUSCULAR; INTRAVENOUS at 09:56

## 2018-12-14 RX ADMIN — VANCOMYCIN HYDROCHLORIDE 1250 MG: 10 INJECTION, POWDER, LYOPHILIZED, FOR SOLUTION INTRAVENOUS at 06:16

## 2018-12-14 RX ADMIN — OXYCODONE HYDROCHLORIDE 15 MG: 15 TABLET ORAL at 09:45

## 2018-12-14 RX ADMIN — Medication 10 ML: at 09:45

## 2018-12-14 RX ADMIN — IPRATROPIUM BROMIDE AND ALBUTEROL SULFATE 1 AMPULE: .5; 3 SOLUTION RESPIRATORY (INHALATION) at 07:51

## 2018-12-14 RX ADMIN — GABAPENTIN 800 MG: 400 CAPSULE ORAL at 13:45

## 2018-12-14 RX ADMIN — MORPHINE SULFATE 15 MG: 15 TABLET, FILM COATED, EXTENDED RELEASE ORAL at 09:45

## 2018-12-14 RX ADMIN — ALPRAZOLAM 0.5 MG: 0.5 TABLET ORAL at 09:45

## 2018-12-14 RX ADMIN — PANTOPRAZOLE SODIUM 40 MG: 40 TABLET, DELAYED RELEASE ORAL at 06:14

## 2018-12-14 RX ADMIN — CEFEPIME HYDROCHLORIDE 2 G: 2 INJECTION, POWDER, FOR SOLUTION INTRAVENOUS at 04:04

## 2018-12-14 RX ADMIN — Medication 2 PUFF: at 07:51

## 2018-12-14 ASSESSMENT — PAIN SCALES - GENERAL
PAINLEVEL_OUTOF10: 0
PAINLEVEL_OUTOF10: 9
PAINLEVEL_OUTOF10: 7
PAINLEVEL_OUTOF10: 7
PAINLEVEL_OUTOF10: 0

## 2018-12-14 NOTE — DISCHARGE SUMMARY
mouth 2 times daily. .             nicotine (NICODERM CQ) 7 MG/24HR  Place 1 patch onto the skin every 24 hours              oxyCODONE (OXY-IR) 15 MG immediate release tablet  Take 15 mg by mouth 2 times daily. .             pantoprazole (PROTONIX) 40 MG tablet  Take 40 mg by mouth daily             potassium chloride (KLOR-CON M) 20 MEQ extended release tablet  Take 20 mEq by mouth daily             rOPINIRole (REQUIP) 0.5 MG tablet  Take 0.5 mg by mouth 2 times daily             ruxolitinib phosphate (JAKAFI) 20 MG tablet  Take 20 mg by mouth 2 times daily                 Brief Summary of Patient's Course:  Patient presented to the ER with nausea/vomiting and diarrhea. She was admittedto telemetry bed and started on IV antibiotics and HHN. Pulmonary and GI consults obtained. She improved with treatment. She underwent Colonoscopy which was unremarkable. Significant Diagnostic Studies:   1. Echocardiogram:  -Normal left ventricle size, wall thickness, and systolic function with an   estimated ejection fraction of 60%.  -No regional wall motion abnormalities are seen.   -Aortic valve appears sclerotic but opens adequately.   -Trivial aortic regurgitation.   -Thickened mitral valve without evidence of stenosis. Trace mitral   regurgitation.   -Trivial tricuspid regurgitation with a RVSP estimation of 35 mmHg.   -Normal diastolic function. E/e'=11.1  2. CT Abdomen and Pelvis and CTPA Chest:   Multifocal airspace disease in both lungs, right greater than left.    Pneumonia is favored.  Multiple associated nodules are noted.  This may   represent sequela of inflammatory/infectious nodules, however given the   history, metastatic disease would be difficult to exclude.  Continued   follow-up recommended.       No findings diagnostic of pulmonary embolus or aortic aneurysm/dissection.       Hiatal hernia       Indeterminate right renal apex low-density lesion.  This is stable       Stable multiple low-density renal lesions are likely tiny cysts.       Multifocal postop change       No pathologic adenopathy or new mass.       Question wall thickening in the distal most aspect of the colon.  Correlate   clinically as relates to potential colitis.       Stable permeative appearing lesion right proximal femur         Discharged Condition: good  /73   Pulse 83   Temp 96.9 °F (36.1 °C) (Temporal)   Resp 16   Ht 5' 4\" (1.626 m)   Wt 148 lb 12.8 oz (67.5 kg)   SpO2 95%   BMI 25.54 kg/m²   CBC: No results for input(s): WBC in the last 72 hours. Invalid input(s):  HGB,  PLT  BMP:   Recent Labs      12/13/18   0229   NA  142       Disposition: home    Discharge Instructions: Activity: activity as tolerated  Diet: regular diet  Wound Care: none needed  Follow Up: David Diane MD in 1 week. Call to schedule appointment    Total time spent in arranging discharge and seeing patient is more than 30 minutes.     Attending Physician:   David Diane MD, 12/14/2018, 3:09 PM

## 2018-12-15 LAB
BLOOD CULTURE, ROUTINE: NORMAL
CULTURE, BLOOD 2: NORMAL

## 2018-12-24 ENCOUNTER — APPOINTMENT (OUTPATIENT)
Dept: GENERAL RADIOLOGY | Age: 63
End: 2018-12-24
Payer: COMMERCIAL

## 2018-12-24 ENCOUNTER — TELEPHONE (OUTPATIENT)
Dept: OTHER | Facility: CLINIC | Age: 63
End: 2018-12-24

## 2018-12-24 ENCOUNTER — HOSPITAL ENCOUNTER (EMERGENCY)
Age: 63
Discharge: HOME | End: 2018-12-24
Attending: EMERGENCY MEDICINE
Payer: COMMERCIAL

## 2018-12-24 VITALS
DIASTOLIC BLOOD PRESSURE: 75 MMHG | SYSTOLIC BLOOD PRESSURE: 122 MMHG | HEART RATE: 85 BPM | RESPIRATION RATE: 20 BRPM | TEMPERATURE: 99.1 F | OXYGEN SATURATION: 100 % | BODY MASS INDEX: 25.27 KG/M2 | WEIGHT: 148 LBS | HEIGHT: 64 IN

## 2018-12-24 DIAGNOSIS — R11.2 NAUSEA AND VOMITING IN ADULT: Primary | ICD-10-CM

## 2018-12-24 DIAGNOSIS — R06.02 SHORTNESS OF BREATH: ICD-10-CM

## 2018-12-24 LAB
A/G RATIO: 1.2 (ref 1.1–2.2)
ALBUMIN SERPL-MCNC: 4.2 G/DL (ref 3.4–5)
ALP BLD-CCNC: 126 U/L (ref 40–129)
ALT SERPL-CCNC: 20 U/L (ref 10–40)
ANION GAP SERPL CALCULATED.3IONS-SCNC: 19 MMOL/L (ref 3–16)
AST SERPL-CCNC: 19 U/L (ref 15–37)
BASOPHILS ABSOLUTE: 0 K/UL (ref 0–0.2)
BASOPHILS RELATIVE PERCENT: 0.3 %
BILIRUB SERPL-MCNC: 0.7 MG/DL (ref 0–1)
BUN BLDV-MCNC: 16 MG/DL (ref 7–20)
CALCIUM SERPL-MCNC: 9.9 MG/DL (ref 8.3–10.6)
CHLORIDE BLD-SCNC: 98 MMOL/L (ref 99–110)
CO2: 19 MMOL/L (ref 21–32)
CREAT SERPL-MCNC: <0.5 MG/DL (ref 0.6–1.2)
EOSINOPHILS ABSOLUTE: 0 K/UL (ref 0–0.6)
EOSINOPHILS RELATIVE PERCENT: 0.8 %
GFR AFRICAN AMERICAN: >60
GFR NON-AFRICAN AMERICAN: >60
GLOBULIN: 3.5 G/DL
GLUCOSE BLD-MCNC: 99 MG/DL (ref 70–99)
HCT VFR BLD CALC: 42.6 % (ref 36–48)
HEMOGLOBIN: 14.1 G/DL (ref 12–16)
LACTIC ACID: 1.4 MMOL/L (ref 0.4–2)
LIPASE: 16 U/L (ref 13–60)
LYMPHOCYTES ABSOLUTE: 0.2 K/UL (ref 1–5.1)
LYMPHOCYTES RELATIVE PERCENT: 4.2 %
MCH RBC QN AUTO: 29.4 PG (ref 26–34)
MCHC RBC AUTO-ENTMCNC: 32.9 G/DL (ref 31–36)
MCV RBC AUTO: 89.2 FL (ref 80–100)
MONOCYTES ABSOLUTE: 0.3 K/UL (ref 0–1.3)
MONOCYTES RELATIVE PERCENT: 6.2 %
NEUTROPHILS ABSOLUTE: 4.2 K/UL (ref 1.7–7.7)
NEUTROPHILS RELATIVE PERCENT: 88.5 %
PDW BLD-RTO: 21.3 % (ref 12.4–15.4)
PLATELET # BLD: 271 K/UL (ref 135–450)
PMV BLD AUTO: 7.7 FL (ref 5–10.5)
POTASSIUM REFLEX MAGNESIUM: 3.6 MMOL/L (ref 3.5–5.1)
PRO-BNP: 133 PG/ML (ref 0–124)
RBC # BLD: 4.78 M/UL (ref 4–5.2)
SODIUM BLD-SCNC: 136 MMOL/L (ref 136–145)
TOTAL PROTEIN: 7.7 G/DL (ref 6.4–8.2)
WBC # BLD: 4.8 K/UL (ref 4–11)

## 2018-12-24 PROCEDURE — 80053 COMPREHEN METABOLIC PANEL: CPT

## 2018-12-24 PROCEDURE — 83690 ASSAY OF LIPASE: CPT

## 2018-12-24 PROCEDURE — 85025 COMPLETE CBC W/AUTO DIFF WBC: CPT

## 2018-12-24 PROCEDURE — 36415 COLL VENOUS BLD VENIPUNCTURE: CPT

## 2018-12-24 PROCEDURE — 6360000002 HC RX W HCPCS: Performed by: PHYSICIAN ASSISTANT

## 2018-12-24 PROCEDURE — 96374 THER/PROPH/DIAG INJ IV PUSH: CPT

## 2018-12-24 PROCEDURE — 83605 ASSAY OF LACTIC ACID: CPT

## 2018-12-24 PROCEDURE — 93005 ELECTROCARDIOGRAM TRACING: CPT | Performed by: INTERNAL MEDICINE

## 2018-12-24 PROCEDURE — 99283 EMERGENCY DEPT VISIT LOW MDM: CPT

## 2018-12-24 PROCEDURE — 2580000003 HC RX 258: Performed by: PHYSICIAN ASSISTANT

## 2018-12-24 PROCEDURE — 87040 BLOOD CULTURE FOR BACTERIA: CPT

## 2018-12-24 PROCEDURE — 83880 ASSAY OF NATRIURETIC PEPTIDE: CPT

## 2018-12-24 PROCEDURE — 71045 X-RAY EXAM CHEST 1 VIEW: CPT

## 2018-12-24 RX ORDER — ONDANSETRON 2 MG/ML
4 INJECTION INTRAMUSCULAR; INTRAVENOUS ONCE
Status: COMPLETED | OUTPATIENT
Start: 2018-12-24 | End: 2018-12-24

## 2018-12-24 RX ORDER — 0.9 % SODIUM CHLORIDE 0.9 %
1000 INTRAVENOUS SOLUTION INTRAVENOUS ONCE
Status: COMPLETED | OUTPATIENT
Start: 2018-12-24 | End: 2018-12-24

## 2018-12-24 RX ADMIN — SODIUM CHLORIDE 1000 ML: 9 INJECTION, SOLUTION INTRAVENOUS at 12:20

## 2018-12-24 RX ADMIN — ONDANSETRON 4 MG: 2 INJECTION INTRAMUSCULAR; INTRAVENOUS at 12:20

## 2018-12-24 ASSESSMENT — ENCOUNTER SYMPTOMS
NAUSEA: 1
CONSTIPATION: 0
COLOR CHANGE: 0
DIARRHEA: 0
VOMITING: 1
SHORTNESS OF BREATH: 1
COUGH: 0
BACK PAIN: 0
ABDOMINAL PAIN: 0

## 2018-12-24 NOTE — ED PROVIDER NOTES
pelvic lymphadenectomy, umbilectomy, and layered closure of the abdominal wall    CARPAL TUNNEL RELEASE      BILAT    CHOLECYSTECTOMY      COLONOSCOPY N/A 12/12/2018    COLONOSCOPY WITH BIOPSY performed by Jodi Skinner MD at / [facility]    HIATAL HERNIA REPAIR  08/03/2018    LAPAROSCOPIC HERNIA HIATAL WITH NISSEN FUNDOPLICATION    HYSTERECTOMY      PARTIAL    PILONIDAL CYST DRAINAGE      UPPER GASTROINTESTINAL ENDOSCOPY N/A 12/12/2018    EGD DIAGNOSTIC ONLY performed by Jodi Skinner MD at [facility]       Discharge Medication List as of 12/24/2018  3:00 PM      CONTINUE these medications which have NOT CHANGED    Details   mometasone-formoterol (DULERA) 100-5 MCG/ACT inhaler Inhale 2 puffs into the lungs 2 times daily, Disp-1 Inhaler, R-2Print      magnesium oxide (MAG-OX) 400 MG tablet Take 1 tablet by mouth daily, Disp-30 tablet, R-1Print      dronabinol (MARINOL) 2.5 MG capsule Take 2.5 mg by mouth 2 times daily (before meals). .Historical Med      oxyCODONE (OXY-IR) 15 MG immediate release tablet Take 15 mg by mouth 2 times daily. [facility] Historical Med      hydrOXYzine (ATARAX) 25 MG tablet Take 25 mg by mouth 3 times daily as needed for ItchingHistorical Med      pantoprazole (PROTONIX) 40 MG tablet Take 40 mg by mouth dailyHistorical Med      rOPINIRole (REQUIP) 0.5 MG tablet Take 0.5 mg by mouth 2 times dailyHistorical Med      potassium chloride (KLOR-CON M) 20 MEQ extended release tablet Take 20 mEq by mouth dailyHistorical Med      ruxolitinib phosphate (JAKAFI) 20 MG tablet Take 20 mg by mouth 2 times dailyHistorical Med      diphenoxylate-atropine (LOMOTIL) 2.5-0.025 MG per tablet Take 1 tablet by mouth 3 times daily as needed for Diarrhea. .Historical Med      morphine (MS CONTIN) 15 MG extended release tablet Take 15 mg by mouth 2 times daily. [facility] Historical Med      ALPRAZolam (XANAX) 0.5 MG tablet Take 0.5 mg Hiatal hernia Indeterminate right renal apex low-density lesion. This is stable Stable multiple low-density renal lesions are likely tiny cysts. Multifocal postop change No pathologic adenopathy or new mass. Question wall thickening in the distal most aspect of the colon. Correlate clinically as relates to potential colitis. Stable permeative appearing lesion right proximal femur     Nm Bone Scan Whole Body    Result Date: 11/27/2018  EXAMINATION: WHOLE BODY BONE SCAN  11/27/2018 TECHNIQUE: The patient was injected intravenously with 27.01 mCi of 99 mTc MDP and scintigraphy of the entire skeleton was performed approximately three hours later. COMPARISON: Patient did not have previous imaging studies for comparison. HISTORY: ORDERING SYSTEM PROVIDED HISTORY: Cancer of trigone of urinary bladder [facility]) TECHNOLOGIST PROVIDED HISTORY: Ordering Physician Provided Reason for Exam: Bladder Cancer Acuity: Unknown Type of Exam: Ongoing Ureteral ileostomy. Right leg above the knee amputation. FINDINGS: No abnormal foci of radiotracer uptake to suggest metastatic disease. Prior amputation is seen, from the mid right femoral shaft distally. There is moderate abnormal radionuclide accumulation at the stump and extending along the distal portion of the remaining shaft greater laterally. There is mild increased soft tissue uptake as well. Mild asymmetric activity is seen, right hip joint likely reactive or degenerative. Left leg is unremarkable. Soft tissue activity is noted within the ureteral ileostomy bag and distal small bowel, presumably in the ureteral ileostomy loop. Normal activity is seen in the kidneys bilaterally. The ribcage, skull and spine are unremarkable. Abnormal activity involving the right femoral stump and distal remaining femoral shaft greater laterally. Possibility of osteomyelitis or heterotopic bone formation are both considered.   Correlation with plain films recommended for initial further

## 2018-12-25 LAB
EKG ATRIAL RATE: 93 BPM
EKG DIAGNOSIS: NORMAL
EKG P AXIS: 71 DEGREES
EKG P-R INTERVAL: 138 MS
EKG Q-T INTERVAL: 370 MS
EKG QRS DURATION: 72 MS
EKG QTC CALCULATION (BAZETT): 460 MS
EKG R AXIS: -3 DEGREES
EKG T AXIS: 26 DEGREES
EKG VENTRICULAR RATE: 93 BPM

## 2018-12-25 PROCEDURE — 93010 ELECTROCARDIOGRAM REPORT: CPT | Performed by: INTERNAL MEDICINE

## 2018-12-29 LAB
BLOOD CULTURE, ROUTINE: NORMAL
CULTURE, BLOOD 2: NORMAL

## 2019-01-15 ENCOUNTER — HOSPITAL ENCOUNTER (OUTPATIENT)
Dept: CT IMAGING | Age: 64
Discharge: HOME | End: 2019-01-15
Payer: COMMERCIAL

## 2019-01-15 ENCOUNTER — HOSPITAL ENCOUNTER (OUTPATIENT)
Age: 64
Discharge: HOME | End: 2019-01-15
Payer: COMMERCIAL

## 2019-01-15 ENCOUNTER — OFFICE VISIT (OUTPATIENT)
Dept: PULMONOLOGY | Age: 64
End: 2019-01-15
Payer: COMMERCIAL

## 2019-01-15 VITALS
SYSTOLIC BLOOD PRESSURE: 105 MMHG | OXYGEN SATURATION: 93 % | WEIGHT: 146 LBS | DIASTOLIC BLOOD PRESSURE: 66 MMHG | TEMPERATURE: 97.7 F | BODY MASS INDEX: 25.06 KG/M2 | RESPIRATION RATE: 16 BRPM | HEART RATE: 89 BPM

## 2019-01-15 DIAGNOSIS — R50.9 PUO (PYREXIA OF UNKNOWN ORIGIN): ICD-10-CM

## 2019-01-15 DIAGNOSIS — R50.9 PUO (PYREXIA OF UNKNOWN ORIGIN): Primary | ICD-10-CM

## 2019-01-15 DIAGNOSIS — R05.9 COUGH: ICD-10-CM

## 2019-01-15 LAB
BASOPHILS ABSOLUTE: 0 K/UL (ref 0–0.2)
BASOPHILS RELATIVE PERCENT: 0.2 %
EOSINOPHILS ABSOLUTE: 0 K/UL (ref 0–0.6)
EOSINOPHILS RELATIVE PERCENT: 1.2 %
HCT VFR BLD CALC: 29.5 % (ref 36–48)
HEMOGLOBIN: 10 G/DL (ref 12–16)
LYMPHOCYTES ABSOLUTE: 0.4 K/UL (ref 1–5.1)
LYMPHOCYTES RELATIVE PERCENT: 11.8 %
MCH RBC QN AUTO: 32.2 PG (ref 26–34)
MCHC RBC AUTO-ENTMCNC: 33.8 G/DL (ref 31–36)
MCV RBC AUTO: 95.2 FL (ref 80–100)
MONOCYTES ABSOLUTE: 0.4 K/UL (ref 0–1.3)
MONOCYTES RELATIVE PERCENT: 13.9 %
NEUTROPHILS ABSOLUTE: 2.2 K/UL (ref 1.7–7.7)
NEUTROPHILS RELATIVE PERCENT: 72.9 %
PDW BLD-RTO: 25 % (ref 12.4–15.4)
PLATELET # BLD: 241 K/UL (ref 135–450)
PMV BLD AUTO: 7.9 FL (ref 5–10.5)
RBC # BLD: 3.1 M/UL (ref 4–5.2)
WBC # BLD: 3.1 K/UL (ref 4–11)

## 2019-01-15 PROCEDURE — 99213 OFFICE O/P EST LOW 20 MIN: CPT | Performed by: INTERNAL MEDICINE

## 2019-01-15 PROCEDURE — G8484 FLU IMMUNIZE NO ADMIN: HCPCS | Performed by: INTERNAL MEDICINE

## 2019-01-15 PROCEDURE — 3017F COLORECTAL CA SCREEN DOC REV: CPT | Performed by: INTERNAL MEDICINE

## 2019-01-15 PROCEDURE — G8419 CALC BMI OUT NRM PARAM NOF/U: HCPCS | Performed by: INTERNAL MEDICINE

## 2019-01-15 PROCEDURE — 85025 COMPLETE CBC W/AUTO DIFF WBC: CPT

## 2019-01-15 PROCEDURE — G8427 DOCREV CUR MEDS BY ELIG CLIN: HCPCS | Performed by: INTERNAL MEDICINE

## 2019-01-15 PROCEDURE — 4004F PT TOBACCO SCREEN RCVD TLK: CPT | Performed by: INTERNAL MEDICINE

## 2019-01-15 PROCEDURE — 71250 CT THORAX DX C-: CPT

## 2019-01-15 PROCEDURE — 36415 COLL VENOUS BLD VENIPUNCTURE: CPT

## 2019-01-15 RX ORDER — AMOXICILLIN AND CLAVULANATE POTASSIUM 875; 125 MG/1; MG/1
1 TABLET, FILM COATED ORAL 2 TIMES DAILY
Qty: 20 TABLET | Refills: 0 | Status: SHIPPED | OUTPATIENT
Start: 2019-01-15 | End: 2019-01-25

## 2019-01-15 ASSESSMENT — ENCOUNTER SYMPTOMS
CHEST TIGHTNESS: 0
SORE THROAT: 0
CHOKING: 0
ABDOMINAL DISTENTION: 0
WHEEZING: 0
COUGH: 1
RHINORRHEA: 0
CONSTIPATION: 0
ABDOMINAL PAIN: 0
BLOOD IN STOOL: 0
BACK PAIN: 0
SINUS PRESSURE: 0
VOICE CHANGE: 0
SHORTNESS OF BREATH: 1
APNEA: 0
ANAL BLEEDING: 0
STRIDOR: 0
DIARRHEA: 0

## 2019-01-16 ENCOUNTER — TELEPHONE (OUTPATIENT)
Dept: PULMONOLOGY | Age: 64
End: 2019-01-16

## 2019-01-23 ENCOUNTER — APPOINTMENT (OUTPATIENT)
Dept: GENERAL RADIOLOGY | Age: 64
End: 2019-01-23
Payer: COMMERCIAL

## 2019-01-23 ENCOUNTER — HOSPITAL ENCOUNTER (EMERGENCY)
Age: 64
Discharge: HOME | End: 2019-01-24
Attending: EMERGENCY MEDICINE
Payer: COMMERCIAL

## 2019-01-23 DIAGNOSIS — R11.2 NON-INTRACTABLE VOMITING WITH NAUSEA, UNSPECIFIED VOMITING TYPE: Primary | ICD-10-CM

## 2019-01-23 DIAGNOSIS — N39.0 URINARY TRACT INFECTION IN FEMALE: ICD-10-CM

## 2019-01-23 LAB
A/G RATIO: 1.1 (ref 1.1–2.2)
ALBUMIN SERPL-MCNC: 3.8 G/DL (ref 3.4–5)
ALP BLD-CCNC: 130 U/L (ref 40–129)
ALT SERPL-CCNC: 17 U/L (ref 10–40)
ANION GAP SERPL CALCULATED.3IONS-SCNC: 14 MMOL/L (ref 3–16)
ANISOCYTOSIS: ABNORMAL
AST SERPL-CCNC: 17 U/L (ref 15–37)
BASOPHILS ABSOLUTE: 0 K/UL (ref 0–0.2)
BASOPHILS RELATIVE PERCENT: 0.5 %
BILIRUB SERPL-MCNC: 0.5 MG/DL (ref 0–1)
BUN BLDV-MCNC: 12 MG/DL (ref 7–20)
CALCIUM SERPL-MCNC: 9.6 MG/DL (ref 8.3–10.6)
CHLORIDE BLD-SCNC: 104 MMOL/L (ref 99–110)
CO2: 22 MMOL/L (ref 21–32)
CREAT SERPL-MCNC: 0.6 MG/DL (ref 0.6–1.2)
EOSINOPHILS ABSOLUTE: 0 K/UL (ref 0–0.6)
EOSINOPHILS RELATIVE PERCENT: 0.2 %
GFR AFRICAN AMERICAN: >60
GFR NON-AFRICAN AMERICAN: >60
GLOBULIN: 3.6 G/DL
GLUCOSE BLD-MCNC: 126 MG/DL (ref 70–99)
HCT VFR BLD CALC: 34.1 % (ref 36–48)
HEMOGLOBIN: 11.7 G/DL (ref 12–16)
LIPASE: 9 U/L (ref 13–60)
LYMPHOCYTES ABSOLUTE: 0.3 K/UL (ref 1–5.1)
LYMPHOCYTES RELATIVE PERCENT: 6.8 %
MACROCYTES: ABNORMAL
MCH RBC QN AUTO: 32.6 PG (ref 26–34)
MCHC RBC AUTO-ENTMCNC: 34.2 G/DL (ref 31–36)
MCV RBC AUTO: 95.3 FL (ref 80–100)
MICROCYTES: ABNORMAL
MONOCYTES ABSOLUTE: 0.3 K/UL (ref 0–1.3)
MONOCYTES RELATIVE PERCENT: 6.2 %
NEUTROPHILS ABSOLUTE: 4.2 K/UL (ref 1.7–7.7)
NEUTROPHILS RELATIVE PERCENT: 86.3 %
PDW BLD-RTO: 26.3 % (ref 12.4–15.4)
PLATELET # BLD: 300 K/UL (ref 135–450)
PLATELET SLIDE REVIEW: ADEQUATE
PMV BLD AUTO: 8 FL (ref 5–10.5)
POLYCHROMASIA: ABNORMAL
POTASSIUM SERPL-SCNC: 3.4 MMOL/L (ref 3.5–5.1)
RBC # BLD: 3.58 M/UL (ref 4–5.2)
SLIDE REVIEW: ABNORMAL
SODIUM BLD-SCNC: 140 MMOL/L (ref 136–145)
TOTAL PROTEIN: 7.4 G/DL (ref 6.4–8.2)
WBC # BLD: 4.8 K/UL (ref 4–11)

## 2019-01-23 PROCEDURE — 83690 ASSAY OF LIPASE: CPT

## 2019-01-23 PROCEDURE — 96374 THER/PROPH/DIAG INJ IV PUSH: CPT

## 2019-01-23 PROCEDURE — 99283 EMERGENCY DEPT VISIT LOW MDM: CPT

## 2019-01-23 PROCEDURE — 96361 HYDRATE IV INFUSION ADD-ON: CPT

## 2019-01-23 PROCEDURE — 96375 TX/PRO/DX INJ NEW DRUG ADDON: CPT

## 2019-01-23 PROCEDURE — 6360000002 HC RX W HCPCS: Performed by: PHYSICIAN ASSISTANT

## 2019-01-23 PROCEDURE — 80053 COMPREHEN METABOLIC PANEL: CPT

## 2019-01-23 PROCEDURE — 87186 SC STD MICRODIL/AGAR DIL: CPT

## 2019-01-23 PROCEDURE — 87077 CULTURE AEROBIC IDENTIFY: CPT

## 2019-01-23 PROCEDURE — 2580000003 HC RX 258: Performed by: PHYSICIAN ASSISTANT

## 2019-01-23 PROCEDURE — 85025 COMPLETE CBC W/AUTO DIFF WBC: CPT

## 2019-01-23 PROCEDURE — 87086 URINE CULTURE/COLONY COUNT: CPT

## 2019-01-23 PROCEDURE — 71045 X-RAY EXAM CHEST 1 VIEW: CPT

## 2019-01-23 RX ORDER — 0.9 % SODIUM CHLORIDE 0.9 %
1000 INTRAVENOUS SOLUTION INTRAVENOUS ONCE
Status: COMPLETED | OUTPATIENT
Start: 2019-01-23 | End: 2019-01-24

## 2019-01-23 RX ORDER — ONDANSETRON 2 MG/ML
4 INJECTION INTRAMUSCULAR; INTRAVENOUS ONCE
Status: COMPLETED | OUTPATIENT
Start: 2019-01-23 | End: 2019-01-23

## 2019-01-23 RX ORDER — PROMETHAZINE HYDROCHLORIDE 25 MG/ML
12.5 INJECTION, SOLUTION INTRAMUSCULAR; INTRAVENOUS ONCE
Status: COMPLETED | OUTPATIENT
Start: 2019-01-23 | End: 2019-01-23

## 2019-01-23 RX ADMIN — SODIUM CHLORIDE 1000 ML: 9 INJECTION, SOLUTION INTRAVENOUS at 23:11

## 2019-01-23 RX ADMIN — ONDANSETRON 4 MG: 2 INJECTION INTRAMUSCULAR; INTRAVENOUS at 22:36

## 2019-01-23 RX ADMIN — PROMETHAZINE HYDROCHLORIDE 12.5 MG: 25 INJECTION INTRAMUSCULAR; INTRAVENOUS at 23:11

## 2019-01-24 VITALS
SYSTOLIC BLOOD PRESSURE: 112 MMHG | HEART RATE: 92 BPM | DIASTOLIC BLOOD PRESSURE: 78 MMHG | RESPIRATION RATE: 16 BRPM | OXYGEN SATURATION: 97 % | TEMPERATURE: 98.3 F

## 2019-01-24 LAB
BACTERIA: ABNORMAL /HPF
BILIRUBIN URINE: NEGATIVE
BLOOD, URINE: ABNORMAL
CLARITY: ABNORMAL
COLOR: YELLOW
COMMENT UA: ABNORMAL
CRYSTALS, UA: ABNORMAL /HPF
EKG ATRIAL RATE: 85 BPM
EKG DIAGNOSIS: NORMAL
EKG P AXIS: 67 DEGREES
EKG P-R INTERVAL: 166 MS
EKG Q-T INTERVAL: 386 MS
EKG QRS DURATION: 82 MS
EKG QTC CALCULATION (BAZETT): 459 MS
EKG R AXIS: -4 DEGREES
EKG T AXIS: 30 DEGREES
EKG VENTRICULAR RATE: 85 BPM
EPITHELIAL CELLS, UA: 4 /HPF (ref 0–5)
GLUCOSE URINE: NEGATIVE MG/DL
HYALINE CASTS: 10 /LPF (ref 0–8)
KETONES, URINE: NEGATIVE MG/DL
LEUKOCYTE ESTERASE, URINE: ABNORMAL
MICROSCOPIC EXAMINATION: YES
NITRITE, URINE: NEGATIVE
PH UA: 7.5
PROTEIN UA: ABNORMAL MG/DL
RBC UA: ABNORMAL /HPF (ref 0–2)
RENAL EPITHELIAL, UA: ABNORMAL /HPF
SPECIFIC GRAVITY UA: 1.01
URINE REFLEX TO CULTURE: YES
URINE TYPE: ABNORMAL
UROBILINOGEN, URINE: 0.2 E.U./DL
WBC UA: 11 /HPF (ref 0–5)

## 2019-01-24 PROCEDURE — 81001 URINALYSIS AUTO W/SCOPE: CPT

## 2019-01-24 PROCEDURE — 93010 ELECTROCARDIOGRAM REPORT: CPT | Performed by: INTERNAL MEDICINE

## 2019-01-24 PROCEDURE — 93005 ELECTROCARDIOGRAM TRACING: CPT | Performed by: PHYSICIAN ASSISTANT

## 2019-01-24 PROCEDURE — 6370000000 HC RX 637 (ALT 250 FOR IP): Performed by: EMERGENCY MEDICINE

## 2019-01-24 PROCEDURE — 96361 HYDRATE IV INFUSION ADD-ON: CPT

## 2019-01-24 RX ORDER — CEPHALEXIN 250 MG/1
500 CAPSULE ORAL ONCE
Status: COMPLETED | OUTPATIENT
Start: 2019-01-24 | End: 2019-01-24

## 2019-01-24 RX ORDER — CEPHALEXIN 500 MG/1
500 CAPSULE ORAL 3 TIMES DAILY
Qty: 21 CAPSULE | Refills: 0 | Status: SHIPPED | OUTPATIENT
Start: 2019-01-24 | End: 2019-01-31

## 2019-01-24 RX ORDER — PROMETHAZINE HYDROCHLORIDE 25 MG/1
25 TABLET ORAL EVERY 6 HOURS PRN
Qty: 10 TABLET | Refills: 0 | Status: SHIPPED | OUTPATIENT
Start: 2019-01-24 | End: 2019-01-31

## 2019-01-24 RX ADMIN — CEPHALEXIN 500 MG: 250 CAPSULE ORAL at 02:22

## 2019-01-24 ASSESSMENT — ENCOUNTER SYMPTOMS
ABDOMINAL PAIN: 0
NAUSEA: 1
DIARRHEA: 0
VOMITING: 0
COUGH: 0
BACK PAIN: 0
CONSTIPATION: 0
COLOR CHANGE: 0
SHORTNESS OF BREATH: 0

## 2019-01-26 LAB
ORGANISM: ABNORMAL
ORGANISM: ABNORMAL
URINE CULTURE, ROUTINE: ABNORMAL

## 2019-02-11 ENCOUNTER — OFFICE VISIT (OUTPATIENT)
Dept: PULMONOLOGY | Age: 64
End: 2019-02-11
Payer: COMMERCIAL

## 2019-02-11 VITALS
DIASTOLIC BLOOD PRESSURE: 77 MMHG | RESPIRATION RATE: 16 BRPM | HEART RATE: 89 BPM | OXYGEN SATURATION: 96 % | SYSTOLIC BLOOD PRESSURE: 111 MMHG

## 2019-02-11 DIAGNOSIS — R06.09 DOE (DYSPNEA ON EXERTION): Primary | ICD-10-CM

## 2019-02-11 DIAGNOSIS — C67.7 MALIGNANT NEOPLASM OF URACHUS (HCC): ICD-10-CM

## 2019-02-11 PROCEDURE — 99213 OFFICE O/P EST LOW 20 MIN: CPT | Performed by: INTERNAL MEDICINE

## 2019-02-11 PROCEDURE — 4004F PT TOBACCO SCREEN RCVD TLK: CPT | Performed by: INTERNAL MEDICINE

## 2019-02-11 PROCEDURE — G8419 CALC BMI OUT NRM PARAM NOF/U: HCPCS | Performed by: INTERNAL MEDICINE

## 2019-02-11 PROCEDURE — G8427 DOCREV CUR MEDS BY ELIG CLIN: HCPCS | Performed by: INTERNAL MEDICINE

## 2019-02-11 PROCEDURE — 3017F COLORECTAL CA SCREEN DOC REV: CPT | Performed by: INTERNAL MEDICINE

## 2019-02-11 PROCEDURE — G8484 FLU IMMUNIZE NO ADMIN: HCPCS | Performed by: INTERNAL MEDICINE

## 2019-02-11 ASSESSMENT — ENCOUNTER SYMPTOMS
BLOOD IN STOOL: 0
SORE THROAT: 0
RHINORRHEA: 0
SHORTNESS OF BREATH: 1
ABDOMINAL PAIN: 0
ABDOMINAL DISTENTION: 0
CHOKING: 0
COUGH: 1
SINUS PRESSURE: 0
WHEEZING: 0
VOICE CHANGE: 0
BACK PAIN: 0
ANAL BLEEDING: 0
APNEA: 0
DIARRHEA: 0
CONSTIPATION: 0
STRIDOR: 0
CHEST TIGHTNESS: 0

## 2019-03-08 ENCOUNTER — HOSPITAL ENCOUNTER (OUTPATIENT)
Dept: PULMONOLOGY | Age: 64
Discharge: HOME | End: 2019-03-08
Payer: COMMERCIAL

## 2019-03-08 VITALS — OXYGEN SATURATION: 96 % | HEART RATE: 83 BPM | RESPIRATION RATE: 16 BRPM

## 2019-03-08 PROCEDURE — 94760 N-INVAS EAR/PLS OXIMETRY 1: CPT

## 2019-03-08 PROCEDURE — 94729 DIFFUSING CAPACITY: CPT

## 2019-03-08 PROCEDURE — 94664 DEMO&/EVAL PT USE INHALER: CPT

## 2019-03-08 PROCEDURE — 94010 BREATHING CAPACITY TEST: CPT

## 2019-03-08 PROCEDURE — 6370000000 HC RX 637 (ALT 250 FOR IP): Performed by: INTERNAL MEDICINE

## 2019-03-08 PROCEDURE — 94060 EVALUATION OF WHEEZING: CPT

## 2019-03-08 PROCEDURE — 94726 PLETHYSMOGRAPHY LUNG VOLUMES: CPT

## 2019-03-08 RX ORDER — ALBUTEROL SULFATE 90 UG/1
4 AEROSOL, METERED RESPIRATORY (INHALATION) ONCE
Status: COMPLETED | OUTPATIENT
Start: 2019-03-08 | End: 2019-03-08

## 2019-03-08 RX ADMIN — Medication 4 PUFF: at 16:26

## 2019-03-19 LAB
DLCO %PRED: 52 %
DLCO PRED: NORMAL ML/MIN/MMHG
DLCO/VA %PRED: NORMAL %
DLCO/VA PRED: NORMAL ML/MIN/MMHG
DLCO/VA: NORMAL ML/MIN/MMHG
DLCO: NORMAL ML/MIN/MMHG
EXPIRATORY TIME: NORMAL SEC
FEF 25-75% %PRED-PRE: NORMAL L/SEC
FEF 25-75% PRED: NORMAL L/SEC
FEF 25-75%-PRE: NORMAL L/SEC
FEV1 %PRED-PRE: 82 %
FEV1 PRED: NORMAL L
FEV1/FVC %PRED-PRE: NORMAL %
FEV1/FVC PRED: NORMAL %
FEV1/FVC: 91 %
FEV1: NORMAL L
FVC %PRED-PRE: NORMAL %
FVC PRED: NORMAL L
FVC: NORMAL L
GAW %PRED: NORMAL %
GAW PRED: NORMAL L/S/CMH2O
GAW: NORMAL L/S/CMH2O
IC %PRED: NORMAL %
IC PRED: NORMAL L
IC: NORMAL L
MVV %PRED-PRE: NORMAL %
MVV PRED: NORMAL L/MIN
MVV-PRE: NORMAL L/MIN
PEF %PRED-PRE: NORMAL L/SEC
PEF PRED: NORMAL L/SEC
PEF-PRE: NORMAL L/SEC
RAW %PRED: NORMAL %
RAW PRED: NORMAL CMH2O/L/S
RAW: NORMAL CMH2O/L/S
RV %PRED: NORMAL %
RV PRED: NORMAL L
RV: NORMAL L
SVC %PRED: NORMAL %
SVC PRED: NORMAL L
SVC: NORMAL L
TLC %PRED: 116 %
TLC PRED: NORMAL L
TLC: NORMAL L
VA %PRED: NORMAL %
VA PRED: NORMAL L
VA: NORMAL L
VTG %PRED: NORMAL %
VTG PRED: NORMAL L
VTG: NORMAL L

## 2019-03-19 ASSESSMENT — PULMONARY FUNCTION TESTS
FEV1_PERCENT_PREDICTED_PRE: 82
FEV1/FVC: 91

## 2019-04-17 ENCOUNTER — HOSPITAL ENCOUNTER (OUTPATIENT)
Dept: CT IMAGING | Age: 64
Discharge: HOME | End: 2019-04-17
Payer: COMMERCIAL

## 2019-04-17 DIAGNOSIS — C67.0 MALIGNANT NEOPLASM OF TRIGONE OF URINARY BLADDER (HCC): ICD-10-CM

## 2019-04-17 LAB
BUN BLDV-MCNC: 14 MG/DL (ref 7–20)
CREAT SERPL-MCNC: 0.6 MG/DL (ref 0.6–1.2)
GFR AFRICAN AMERICAN: >60
GFR NON-AFRICAN AMERICAN: >60

## 2019-04-17 PROCEDURE — 82565 ASSAY OF CREATININE: CPT

## 2019-04-17 PROCEDURE — 84520 ASSAY OF UREA NITROGEN: CPT

## 2019-04-17 PROCEDURE — 36415 COLL VENOUS BLD VENIPUNCTURE: CPT

## 2019-04-17 PROCEDURE — 6360000004 HC RX CONTRAST MEDICATION: Performed by: RADIOLOGY

## 2019-04-17 PROCEDURE — 74177 CT ABD & PELVIS W/CONTRAST: CPT

## 2019-04-17 RX ADMIN — IOHEXOL 50 ML: 240 INJECTION, SOLUTION INTRATHECAL; INTRAVASCULAR; INTRAVENOUS; ORAL at 16:37

## 2019-04-17 RX ADMIN — IOPAMIDOL 75 ML: 755 INJECTION, SOLUTION INTRAVENOUS at 16:37

## 2019-05-01 ENCOUNTER — HOSPITAL ENCOUNTER (OUTPATIENT)
Dept: NUCLEAR MEDICINE | Age: 64
Discharge: HOME | End: 2019-05-01
Payer: COMMERCIAL

## 2019-05-01 DIAGNOSIS — C67.7 CARCINOMA OF URACHUS (HCC): ICD-10-CM

## 2019-05-01 DIAGNOSIS — C79.51 BONY METASTASIS (HCC): ICD-10-CM

## 2019-05-01 PROCEDURE — A9503 TC99M MEDRONATE: HCPCS | Performed by: RADIOLOGY

## 2019-05-01 PROCEDURE — 3430000000 HC RX DIAGNOSTIC RADIOPHARMACEUTICAL: Performed by: RADIOLOGY

## 2019-05-01 PROCEDURE — 78306 BONE IMAGING WHOLE BODY: CPT

## 2019-05-01 RX ORDER — TC 99M MEDRONATE 20 MG/10ML
26.4 INJECTION, POWDER, LYOPHILIZED, FOR SOLUTION INTRAVENOUS
Status: COMPLETED | OUTPATIENT
Start: 2019-05-01 | End: 2019-05-01

## 2019-05-01 RX ADMIN — Medication 26.4 MILLICURIE: at 09:09

## 2022-07-15 NOTE — PROGRESS NOTES
alert and cooperative  HEAD: Normocephalic, without obvious abnormality, atraumatic  NECK: No palpable lymphadenopathy in supraclavicular, axillary or cervical chains  LUNGS: Clear to auscultation bilaterally, no audible rales, wheezes or crackles  HEART: Regular rate and rhythm, S1, S2 normal  ABDOMEN: Soft, non-tender; bowel sounds normal; no masses, no organomegaly. ileoconduit intact. EXTREMITIES: without cyanosis, clubbing, edema or asymmetry  SKIN: No jaundice, purpura or petechiae    LABS:     CBC:  Lab Results   Component Value Date    WBC 7.2 09/22/2018    HGB 13.0 09/22/2018    HCT 39.3 09/22/2018    .2 (H) 09/22/2018     09/22/2018    LYMPHOPCT 20.9 09/22/2018    RBC 3.81 (L) 09/22/2018    MCH 34.0 09/22/2018    MCHC 33.0 09/22/2018    RDW 18.3 (H) 09/22/2018       Lab Results   Component Value Date     09/22/2018    K 3.4 (L) 09/22/2018     09/22/2018    CO2 21 09/22/2018    BUN 7 09/22/2018    CREATININE 0.5 (L) 09/22/2018    GLUCOSE 73 09/22/2018    CALCIUM 8.7 09/22/2018    PROT 7.0 09/21/2018    LABALBU 3.7 09/21/2018    BILITOT 0.4 09/21/2018    ALKPHOS 164 (H) 09/21/2018    AST 14 (L) 09/21/2018    ALT 14 09/21/2018    LABGLOM >60 09/22/2018    GFRAA >60 09/22/2018    AGRATIO 1.1 09/21/2018    GLOB 3.3 09/21/2018       No results found for: PTINR    IMAGING:       Ct Abdomen Pelvis W Iv Contrast Additional Contrast? None    Result Date: 9/21/2018  EXAMINATION: CT OF THE ABDOMEN AND PELVIS WITH CONTRAST 9/21/2018 7:24 pm TECHNIQUE: CT of the abdomen and pelvis was performed with the administration of intravenous contrast. Multiplanar reformatted images are provided for review. Dose modulation, iterative reconstruction, and/or weight based adjustment of the mA/kV was utilized to reduce the radiation dose to as low as reasonably achievable.  COMPARISON: August 25, 2018 HISTORY: ORDERING PHYSICIAN PROVIDED HISTORY: abd pain, n/v TECHNOLOGIST PROVIDED HISTORY: If patient is on positive. The goal is to lower her Hct to 42%. On Hydrea 500 mg three times a week. Counts under good control. · Right leg arterial ischemia and thrombosis, status post AKA. · CVA x 2.   · Chronic tobacco use. · Chronic pain syndrome. · Depression. On antidepressant. Reviewed her labs. Discussed her symptoms. Adjusted pain medications. Advance diet. GI consult. Explained to the patient and spoke to the nurse. Leslie Moreno M.D.; M.S. Medical Oncology/Hematology  Phone: [phone]  Fax: [phone]    [address] # [place], [address] [address].   [facility] patient

## 2023-04-15 NOTE — ED NOTES
Pharmacy Medication History Note      List of current medications patient is taking is complete. Source of information: patient    Changes made to medication list:  Medications flagged for removal (include reason, ex. noncompliance):  · N/A    Medications removed (include reason, ex. therapy complete or physician discontinued): · Cefdinir- therapy complete    Medications added/doses adjusted:  · N/A    Other notes (ex. Recent course of antibiotics, Coumadin dosing):  · Denies use of other OTC or herbal medications. Last dose times updated.    Layla
Warm

## (undated) DEVICE — FORCEPS BX L240CM WRK CHN 2.8MM STD CAP W/ NDL MIC MESH

## (undated) DEVICE — SOLUTION IV IRRIG WATER 500ML POUR BRL ST 2F7113

## (undated) DEVICE — BW-412T DISP COMBO CLEANING BRUSH: Brand: SINGLE USE COMBINATION CLEANING BRUSH

## (undated) DEVICE — PROCEDURE KIT ENDOSCP CUST